# Patient Record
Sex: FEMALE | Race: WHITE | Employment: OTHER | URBAN - METROPOLITAN AREA
[De-identification: names, ages, dates, MRNs, and addresses within clinical notes are randomized per-mention and may not be internally consistent; named-entity substitution may affect disease eponyms.]

---

## 2018-08-09 LAB
ALBUMIN SERPL-MCNC: NORMAL G/DL
ALP BLD-CCNC: NORMAL U/L
ALT SERPL-CCNC: NORMAL U/L
ANION GAP SERPL CALCULATED.3IONS-SCNC: NORMAL MMOL/L
AST SERPL-CCNC: NORMAL U/L
BILIRUB SERPL-MCNC: NORMAL MG/DL (ref 0.1–1.4)
BUN BLDV-MCNC: NORMAL MG/DL
CALCIUM SERPL-MCNC: NORMAL MG/DL
CHLORIDE BLD-SCNC: NORMAL MMOL/L
CO2: NORMAL MMOL/L
CREAT SERPL-MCNC: NORMAL MG/DL
GFR CALCULATED: NORMAL
GLUCOSE BLD-MCNC: NORMAL MG/DL
POTASSIUM SERPL-SCNC: NORMAL MMOL/L
SODIUM BLD-SCNC: NORMAL MMOL/L
TOTAL PROTEIN: NORMAL

## 2018-08-20 ENCOUNTER — HOSPITAL ENCOUNTER (OUTPATIENT)
Age: 68
Discharge: HOME | End: 2018-08-22
Payer: MEDICARE

## 2018-08-20 PROCEDURE — 87088 URINE BACTERIA CULTURE: CPT

## 2018-08-23 LAB — URINE CULTURE, ROUTINE: NORMAL

## 2018-12-20 ENCOUNTER — OFFICE VISIT (OUTPATIENT)
Dept: ENDOCRINOLOGY | Age: 68
End: 2018-12-20
Payer: MEDICARE

## 2018-12-20 VITALS
SYSTOLIC BLOOD PRESSURE: 128 MMHG | TEMPERATURE: 97.6 F | WEIGHT: 158.6 LBS | RESPIRATION RATE: 16 BRPM | HEART RATE: 85 BPM | HEIGHT: 65 IN | BODY MASS INDEX: 26.42 KG/M2 | OXYGEN SATURATION: 97 % | DIASTOLIC BLOOD PRESSURE: 78 MMHG

## 2018-12-20 DIAGNOSIS — E06.3 HYPOTHYROIDISM DUE TO HASHIMOTO'S THYROIDITIS: Primary | ICD-10-CM

## 2018-12-20 DIAGNOSIS — E03.8 HYPOTHYROIDISM DUE TO HASHIMOTO'S THYROIDITIS: Primary | ICD-10-CM

## 2018-12-20 DIAGNOSIS — E04.1 THYROID NODULE: ICD-10-CM

## 2018-12-20 PROCEDURE — 99205 OFFICE O/P NEW HI 60 MIN: CPT | Performed by: INTERNAL MEDICINE

## 2018-12-20 RX ORDER — LIOTHYRONINE SODIUM 5 UG/1
TABLET ORAL
Qty: 30 TABLET | Refills: 5 | Status: SHIPPED | OUTPATIENT
Start: 2018-12-20 | End: 2019-06-19 | Stop reason: SDUPTHER

## 2018-12-20 RX ORDER — LEVOTHYROXINE SODIUM 0.07 MG/1
75 TABLET ORAL DAILY
Qty: 30 TABLET | Refills: 5 | Status: SHIPPED | OUTPATIENT
Start: 2018-12-20 | End: 2019-06-19 | Stop reason: SDUPTHER

## 2018-12-20 RX ORDER — ATENOLOL 50 MG/1
50 TABLET ORAL DAILY
COMMUNITY
End: 2021-04-12 | Stop reason: SDUPTHER

## 2018-12-20 RX ORDER — RANITIDINE 300 MG/1
300 CAPSULE ORAL DAILY PRN
COMMUNITY
End: 2019-12-18

## 2018-12-20 NOTE — PATIENT INSTRUCTIONS
Cont levothyroxine 75 mcg every morning  Change liothyronine (cytomel) to 2.5 mcg every morning and every afternoon  Repeat the TSH now as previously ordered by other physician  See me back in six months  Have a thryoid US performed 1-2 weeks prior to that  Call with questions or concerns :  [phone]

## 2019-06-19 ENCOUNTER — OFFICE VISIT (OUTPATIENT)
Dept: ENDOCRINOLOGY | Age: 69
End: 2019-06-19
Payer: MEDICARE

## 2019-06-19 VITALS
OXYGEN SATURATION: 97 % | WEIGHT: 158.4 LBS | HEART RATE: 78 BPM | HEIGHT: 64 IN | SYSTOLIC BLOOD PRESSURE: 130 MMHG | DIASTOLIC BLOOD PRESSURE: 80 MMHG | BODY MASS INDEX: 27.04 KG/M2 | RESPIRATION RATE: 16 BRPM

## 2019-06-19 DIAGNOSIS — E03.8 HYPOTHYROIDISM DUE TO HASHIMOTO'S THYROIDITIS: Primary | ICD-10-CM

## 2019-06-19 DIAGNOSIS — E06.3 HYPOTHYROIDISM DUE TO HASHIMOTO'S THYROIDITIS: Primary | ICD-10-CM

## 2019-06-19 DIAGNOSIS — E04.1 THYROID NODULE: ICD-10-CM

## 2019-06-19 PROCEDURE — 99214 OFFICE O/P EST MOD 30 MIN: CPT | Performed by: INTERNAL MEDICINE

## 2019-06-19 RX ORDER — LIOTHYRONINE SODIUM 5 UG/1
TABLET ORAL
Qty: 30 TABLET | Refills: 5 | Status: SHIPPED | OUTPATIENT
Start: 2019-06-19 | End: 2020-01-13 | Stop reason: CLARIF

## 2019-06-19 RX ORDER — LEVOTHYROXINE SODIUM 0.07 MG/1
TABLET ORAL
Qty: 30 TABLET | Refills: 5 | Status: SHIPPED | OUTPATIENT
Start: 2019-06-19 | End: 2020-01-23 | Stop reason: SDUPTHER

## 2019-06-19 NOTE — PROGRESS NOTES
CC -   Hypothyroidism, Obesity    HPI -       Pt returns for follow up of last visit with me on 12/20/2019        Instructions from last visit were:  1. Hypothyroidism - controlled              TSH 0.727 8/24/18 on LT4 75 mcg daily and T3 5mcg every morning              I prefer her to have a TSH in the 5.0-6.0 range. However, she prefers the lower edge of nml. I explained the risks for osteoporosis and cardiac problems. She understands. Plan :                          Cont LT4 75 mcg daily                          Split the liothyroinine to 2.5 mcg morning and mid-afternoon                                     Pt already has been ordered a TSH for now by another physician                          Will recheck the TSH in three months                          Needs a DEXA     2. Nontoxic MNG - status unknown              Repeat the US of the thyroid 1-2 weeks prior to the next visit     3.  Questions about adrenal insufficiency              No problems with the adrenal axis - ACTH stim test in 2017 was nml; electrolytes and BP are also nml     3. Follow up              See me back in six weeks          Update from today's encounter:  Presently taking LT4 75mcg daily and liothyronine 2.5mg bid. TSH 0.264  6/15/19  TSH not drawn at three months as planned.  Had done a few days ago    No hair loss, tremors, or temp intolerance, no D/C, no palpitations, +insomnia (chronic), no weight gain or loss    Thyroid US performed at [facility] 6/6/19: Right 1 and 1.2 cm hypoechoic nodules and a 1.6 hypoechoic nodules    No dysphagia or hoarseness    PFSH - no changes    Meds -  Current Outpatient Medications on File Prior to Visit   Medication Sig Dispense Refill    estradiol (VIVELLE) 0.1 MG/24HR APPLY 1 PATCH TO SKIN TWICE A WEEK 24 patch 2    atenolol (TENORMIN) 50 MG tablet Take 50 mg by mouth daily Breaks in half- 25 mg in the am and 25 mg in the pm      ranitidine (ZANTAC) 300 MG capsule Take 300 mg by mouth daily as needed for Heartburn      liothyronine (CYTOMEL) 5 MCG tablet Take one-half tab every morning and one-half tab every mid-afternoon 30 tablet 5    levothyroxine (SYNTHROID) 75 MCG tablet Take 1 tablet by mouth daily 30 tablet 5    docusate sodium (COLACE) 100 MG capsule Take 100 mg by mouth daily [facility]      KOREAN GINSENG PO Take by mouth as needed       Multiple Vitamins-Minerals (CENTRUM CARDIO) TABS Take 1 tablet by mouth daily       DHA-EPA-VITAMIN E PO Take 4 capsules by mouth Mon- Wed- Friday      QUEtiapine (SEROQUEL) 50 MG tablet Take 50 mg by mouth every morning       carbidopa-levodopa (SINEMET CR)  MG per extended release tablet Take by mouth as needed       estradiol cypionate (DEPO-ESTRADIOL) 5 MG/ML injection Inject 1 mL into the muscle once for 1 dose Inject 1ML Every 4 Weeks (Patient taking differently: Inject 0.1 mg into the muscle once Inject 0.1 Every 3 Weeks) 5 mL 0    Coenzyme Q-10 200 MG CAPS Take 800 mg by mouth daily       Ascorbic Acid (VITAMIN C) 100 MG tablet Take 100 mg by mouth Twice weekly      Multiple Vitamins-Minerals (CENTRUM ADULTS PO) daily       aspirin 81 MG tablet Take by mouth daily       methylcellulose 500 MG TABS 1 qd      Ergocalciferol (VITAMIN D2 PO) Take 400 Units by mouth daily       ALPRAZolam (XANAX) 0.5 MG tablet Take 0.5 mg by mouth as needed. Yuan Melgar atorvastatin (LIPITOR) 80 MG tablet Take 80 mg by mouth daily       enalapril (VASOTEC) 2.5 MG tablet Take 2.5 mg by mouth daily       ezetimibe (ZETIA) 10 MG tablet Take 10 mg by mouth daily       lansoprazole (PREVACID) 15 MG capsule Take 15 mg by mouth daily       QUEtiapine (SEROQUEL XR) 200 MG XR tablet Take 200 mg by mouth nightly       venlafaxine (EFFEXOR XR) 75 MG XR capsule Take 75 mg by mouth daily       vitamin E 400 UNIT capsule Take by mouth       No current facility-administered medications on file prior to visit.         ROS - Card: no CP, GI: no N/V/D,  : no

## 2019-06-19 NOTE — PATIENT INSTRUCTIONS
Cont Cytomel 2.5 mcg twice daily  Reduce Levothyroxine 75 mcg to one tablet every day Mon-Sat and one-half tablet on Sundays  Have a TSH performed in six weeks  Have another TSH drawn a few days before the next appt  Get a cd of the [address] study done at [facility] and take to the Radiology Dept at [facility] and ask them to upload it onto the PACS system.  Text me when you have given them the cd [phone])  See me back in six months

## 2019-06-30 ENCOUNTER — HOSPITAL ENCOUNTER (EMERGENCY)
Age: 69
Discharge: HOME | End: 2019-06-30
Attending: EMERGENCY MEDICINE
Payer: MEDICARE

## 2019-06-30 ENCOUNTER — APPOINTMENT (OUTPATIENT)
Dept: GENERAL RADIOLOGY | Age: 69
End: 2019-06-30
Payer: MEDICARE

## 2019-06-30 VITALS
OXYGEN SATURATION: 97 % | TEMPERATURE: 98.2 F | BODY MASS INDEX: 24.92 KG/M2 | DIASTOLIC BLOOD PRESSURE: 73 MMHG | WEIGHT: 146 LBS | SYSTOLIC BLOOD PRESSURE: 156 MMHG | RESPIRATION RATE: 16 BRPM | HEIGHT: 64 IN | HEART RATE: 66 BPM

## 2019-06-30 DIAGNOSIS — M77.8 TENDONITIS OF ELBOW, RIGHT: Primary | ICD-10-CM

## 2019-06-30 PROCEDURE — 99283 EMERGENCY DEPT VISIT LOW MDM: CPT

## 2019-06-30 PROCEDURE — 73080 X-RAY EXAM OF ELBOW: CPT

## 2019-06-30 RX ORDER — NAPROXEN 500 MG/1
500 TABLET ORAL 2 TIMES DAILY
Qty: 14 TABLET | Refills: 0 | Status: SHIPPED | OUTPATIENT
Start: 2019-06-30 | End: 2019-10-24

## 2019-06-30 NOTE — ED PROVIDER NOTES
without rash. Erythema or warmth to the area. There is no streaking up or down the arm. Neurologic: GCS 15,  Psych: Normal Affect      ------------------------------ ED COURSE/MEDICAL DECISION MAKING----------------------  Medications - No data to display      Medical Decision Making:    She was evaluated by  no good treatment plan was discussed. Will place patient on Naprosyn and give her a sling. She should follow-up with Dr. Kimmie Tidwell her orthopedic doctor. Warning signs discussed with the patient. She should return for any worsening symptoms. Counseling: The emergency provider has spoken with the patient and discussed todays results, in addition to providing specific details for the plan of care and counseling regarding the diagnosis and prognosis. Questions are answered at this time and they are agreeable with the plan.      --------------------------------- IMPRESSION AND DISPOSITION ---------------------------------    IMPRESSION  1.  Tendonitis of elbow, right New Problem       DISPOSITION  Disposition: Discharge to home  Patient condition is stable                 Stephanie Manning, [address]  06/30/19 1471

## 2019-10-02 ENCOUNTER — HOSPITAL ENCOUNTER (OUTPATIENT)
Dept: GENERAL RADIOLOGY | Age: 69
Discharge: HOME | End: 2019-10-04
Payer: MEDICARE

## 2019-10-02 DIAGNOSIS — Z12.39 BREAST CANCER SCREENING: ICD-10-CM

## 2019-10-02 PROCEDURE — 77063 BREAST TOMOSYNTHESIS BI: CPT

## 2019-10-17 RX ORDER — AMPICILLIN TRIHYDRATE 250 MG
CAPSULE ORAL DAILY
COMMUNITY
End: 2019-10-24

## 2019-10-17 RX ORDER — AMOXICILLIN 500 MG
CAPSULE ORAL DAILY
COMMUNITY

## 2019-10-18 RX ORDER — MULTIVIT-MIN/IRON/FOLIC ACID/K 18-600-40
CAPSULE ORAL 2 TIMES DAILY
COMMUNITY
End: 2019-10-24

## 2019-10-18 RX ORDER — LORATADINE 10 MG/1
10 TABLET, ORALLY DISINTEGRATING ORAL DAILY
COMMUNITY
End: 2022-03-02

## 2019-10-18 RX ORDER — ATENOLOL 25 MG/1
25 TABLET ORAL DAILY
COMMUNITY
End: 2019-10-24

## 2019-12-18 ENCOUNTER — OFFICE VISIT (OUTPATIENT)
Dept: ENDOCRINOLOGY | Age: 69
End: 2019-12-18
Payer: MEDICARE

## 2019-12-18 VITALS
BODY MASS INDEX: 25.83 KG/M2 | OXYGEN SATURATION: 97 % | WEIGHT: 155 LBS | SYSTOLIC BLOOD PRESSURE: 132 MMHG | DIASTOLIC BLOOD PRESSURE: 78 MMHG | HEIGHT: 65 IN | RESPIRATION RATE: 16 BRPM | HEART RATE: 66 BPM

## 2019-12-18 DIAGNOSIS — E04.1 THYROID NODULE: ICD-10-CM

## 2019-12-18 DIAGNOSIS — E06.3 HYPOTHYROIDISM DUE TO HASHIMOTO'S THYROIDITIS: Primary | ICD-10-CM

## 2019-12-18 DIAGNOSIS — E03.8 HYPOTHYROIDISM DUE TO HASHIMOTO'S THYROIDITIS: Primary | ICD-10-CM

## 2019-12-18 PROCEDURE — 99214 OFFICE O/P EST MOD 30 MIN: CPT | Performed by: INTERNAL MEDICINE

## 2019-12-18 RX ORDER — FAMOTIDINE 40 MG/1
40 TABLET, FILM COATED ORAL DAILY
COMMUNITY

## 2019-12-18 RX ORDER — FAMOTIDINE 20 MG/1
20 TABLET, FILM COATED ORAL 2 TIMES DAILY
COMMUNITY
End: 2019-12-18 | Stop reason: CLARIF

## 2020-01-13 ENCOUNTER — OFFICE VISIT (OUTPATIENT)
Dept: FAMILY MEDICINE CLINIC | Age: 70
End: 2020-01-13
Payer: MEDICARE

## 2020-01-13 VITALS
DIASTOLIC BLOOD PRESSURE: 70 MMHG | SYSTOLIC BLOOD PRESSURE: 114 MMHG | BODY MASS INDEX: 28.34 KG/M2 | TEMPERATURE: 98.2 F | WEIGHT: 154 LBS | OXYGEN SATURATION: 98 % | RESPIRATION RATE: 14 BRPM | HEART RATE: 66 BPM | HEIGHT: 62 IN

## 2020-01-13 PROCEDURE — 99214 OFFICE O/P EST MOD 30 MIN: CPT | Performed by: FAMILY MEDICINE

## 2020-01-13 ASSESSMENT — PATIENT HEALTH QUESTIONNAIRE - PHQ9
1. LITTLE INTEREST OR PLEASURE IN DOING THINGS: 0
SUM OF ALL RESPONSES TO PHQ9 QUESTIONS 1 & 2: 1
SUM OF ALL RESPONSES TO PHQ QUESTIONS 1-9: 1
SUM OF ALL RESPONSES TO PHQ QUESTIONS 1-9: 1
2. FEELING DOWN, DEPRESSED OR HOPELESS: 1

## 2020-01-13 NOTE — PROGRESS NOTES
Medications and allergies also reviewed and updated in chart.     ROS Unless otherwise specified  Review of Systems - General ROS: negative for - chills, fatigue, fever, night sweats, sleep disturbance, weight gain or weight loss  Psychological ROS: negative for - anxiety, behavioral disorder, depression, hallucinations, irritability, memory difficulties, mood swings, sleep disturbances or suicidal ideation  ENT ROS: negative for - epistaxis, headaches, hearing change, nasal congestion, nasal discharge, nasal polyps, sinus pain, tinnitus, vertigo or visual changes  Hematological and Lymphatic ROS: negative for - bleeding problems, blood clots, fatigue or swollen lymph nodes  Respiratory ROS: negative for - cough, orthopnea, shortness of breath, sputum changes, tachypnea or wheezing  Cardiovascular ROS: negative for - chest pain, dyspnea on exertion, irregular heartbeat, loss of consciousness, palpitations, paroxysmal nocturnal dyspnea or rapid heart rate  Gastrointestinal ROS: negative for - abdominal pain, blood in stools, change in bowel habits, constipation, diarrhea, gas/bloating, heartburn or nausea/vomiting  Musculoskeletal ROS: negative for - joint pain, joint stiffness, joint swelling or muscle, back pain, bowel or bladder incontinence  Neurological ROS: negative for - behavioral changes, confusion, dizziness, headaches, memory loss, numbness/tingling, seizures or speech problems, weakness  Dermatological ROS: negative for - dry skin, mole changes, nail changes, pruritus, rash or skin lesion changes    Physical Exam  Wt Readings from Last 3 Encounters:   01/20/20 154 lb 12.8 oz (70.2 kg)   01/13/20 154 lb (69.9 kg)   12/18/19 155 lb (70.3 kg)     Temp Readings from Last 3 Encounters:   01/13/20 98.2 °F (36.8 °C)   06/30/19 98.2 °F (36.8 °C)   12/20/18 97.6 °F (36.4 °C) (Temporal)     BP Readings from Last 3 Encounters:   01/20/20 130/70   01/13/20 114/70   12/18/19 132/78     Pulse Readings from Last 3 Encounters:   01/13/20 66   12/18/19 66   06/30/19 66       General appearance: alert, well appearing, and in no distress, oriented to person, place, and time and normal appearing weight. CVS exam: normal rate, regular rhythm, normal S1, S2, no murmurs, rubs, clicks or gallops. Radial pulses 2+ bilateral.  PT/DP pulse 2+ bilat. No C/C/E    Chest: clear to auscultation, no wheezes, rales or rhonchi, symmetric air entry. Abdomen: Soft, non-tender, non-distended, positive BS in all 4 quadrants    Extremities:Dorsalis pedis pulses palpated bilaterally, no clubbing, cyanosis, edema or erythema, Sensory exam of the foot is normal, tested with the monofilament. Good pulses, no lesions or ulcers, good peripheral pulses. SKIN: warm, dry, no lesions, jaundice, petechiae, pallor, cyanosis, ecchymosis    NEURO: gross motor exam normal by observation, gait normal    Mental status - alert, oriented to person, place, and time, normal mood, behavior, speech, dress, motor activity, and thought processes      Assessment/Plan  Aishwarya Nichols was seen today for established new doctor and hypertension. Diagnoses and all orders for this visit:    Essential hypertension  Mixed hyperlipidemia  Acquired hypothyroidism  - Will review labs. I did spend more than 25 minutes in direct patient care discussing and treating above diagnoses on this patient who is NEW to me. Return in about 6 months (around 7/13/2020) for AWV. Call or go to ED immediately if symptoms worsen or persist.    Educational materials and/or home exercises printed for patient's review and were included in patient instructions on his/her After Visit Summary and given to patient at the end of visit. Counseled regarding above diagnosis, including possible risks and complications,  especially if left uncontrolled.     Counseled regarding the possible side effects, risks, benefits and alternatives to treatment; patient and/or guardian verbalizes understanding, agrees, feels comfortable with and wishes to proceed with above treatment plan. Advised patient to call with any new medication issues, and read all Rx info from pharmacy to assure aware of all possible risks and side effects of medication before taking. Reviewed age and gender appropriate health screening exams and vaccinations. Advised patient regarding importance of keeping up with recommended health maintenance and to schedule as soon as possible if overdue, as this is important in assessing for undiagnosed pathology, especially cancer, as well as protecting against potentially harmful/life threatening disease. Patient and/or guardian verbalizes understanding and agrees with above counseling, assessment and plan. All questions answered.     Lola Lemon, DO

## 2020-01-23 RX ORDER — LEVOTHYROXINE SODIUM 0.07 MG/1
TABLET ORAL
Qty: 30 TABLET | Refills: 5 | Status: SHIPPED
Start: 2020-01-23 | End: 2020-06-09 | Stop reason: SDUPTHER

## 2020-02-06 ENCOUNTER — OFFICE VISIT (OUTPATIENT)
Dept: FAMILY MEDICINE CLINIC | Age: 70
End: 2020-02-06
Payer: MEDICARE

## 2020-02-06 VITALS
WEIGHT: 153.6 LBS | HEIGHT: 65 IN | HEART RATE: 58 BPM | DIASTOLIC BLOOD PRESSURE: 86 MMHG | TEMPERATURE: 98 F | BODY MASS INDEX: 25.59 KG/M2 | OXYGEN SATURATION: 97 % | SYSTOLIC BLOOD PRESSURE: 132 MMHG | RESPIRATION RATE: 16 BRPM

## 2020-02-06 PROCEDURE — 99214 OFFICE O/P EST MOD 30 MIN: CPT | Performed by: FAMILY MEDICINE

## 2020-02-06 RX ORDER — AZITHROMYCIN 250 MG/1
250 TABLET, FILM COATED ORAL SEE ADMIN INSTRUCTIONS
Qty: 6 TABLET | Refills: 0 | Status: SHIPPED | OUTPATIENT
Start: 2020-02-06 | End: 2020-02-11

## 2020-02-06 RX ORDER — QUETIAPINE FUMARATE 50 MG/1
50 TABLET, EXTENDED RELEASE ORAL EVERY MORNING
COMMUNITY
Start: 2020-01-22

## 2020-02-06 NOTE — PROGRESS NOTES
2/10/2020    Chief Complaint   Patient presents with    Cough     Pt has dry cough, drainage, & swollen glands on Lt side       HPI    Hank He is a 68 y.o. patient that presents today with cold symptoms. Upper Respiratory Infection:  Patient complains of symptoms of a URI. Symptoms include congestion, cough, sore throat and swollen glands. Onset of symptoms was many days ago, gradually worsening since that time. She is drinking plenty of fluids. Treatment to date: OTC cough suppressant with minimal ROS. Sick contacts include none. Patient's past medical, surgical, social and/or family history reviewed, updated in chart, and are non-contributory (unless otherwise stated). Medications and allergies also reviewed and updated in chart.      ROS Unless otherwise specified  Review of Systems - General ROS: negative for - chills, fatigue, fever, night sweats, sleep disturbance, weight gain or weight loss  Psychological ROS: negative for - anxiety, behavioral disorder, depression, hallucinations, irritability, memory difficulties, mood swings, sleep disturbances or suicidal ideation  ENT ROS: negative for - epistaxis, headaches, hearing change, nasal congestion, nasal discharge, nasal polyps, sinus pain, tinnitus, vertigo or visual changes  Hematological and Lymphatic ROS: negative for - bleeding problems, blood clots, fatigue or swollen lymph nodes  Respiratory ROS: negative for - cough, orthopnea, shortness of breath, sputum changes, tachypnea or wheezing  Cardiovascular ROS: negative for - chest pain, dyspnea on exertion, irregular heartbeat, loss of consciousness, palpitations, paroxysmal nocturnal dyspnea or rapid heart rate  Gastrointestinal ROS: negative for - abdominal pain, blood in stools, change in bowel habits, constipation, diarrhea, gas/bloating, heartburn or nausea/vomiting  Musculoskeletal ROS: negative for - joint pain, joint stiffness, joint swelling or muscle, back pain, bowel or bladder incontinence  Neurological ROS: negative for - behavioral changes, confusion, dizziness, headaches, memory loss, numbness/tingling, seizures or speech problems, weakness  Dermatological ROS: negative for - dry skin, mole changes, nail changes, pruritus, rash or skin lesion changes    Physical Exam  /86   Pulse 58   Temp 98 °F (36.7 °C)   Resp 16   Ht 5' 4.5\" (1.638 m)   Wt 153 lb 9.6 oz (69.7 kg)   SpO2 97%   BMI 25.96 kg/m²     Wt Readings from Last 3 Encounters:   02/06/20 153 lb 9.6 oz (69.7 kg)   01/20/20 154 lb 12.8 oz (70.2 kg)   01/13/20 154 lb (69.9 kg)     BP Readings from Last 3 Encounters:   02/06/20 132/86   01/20/20 130/70   01/13/20 114/70         General appearance: alert, well appearing, and in no distress, oriented to person, place, and time and normal appearing weight. HEENT:  HEAD: Atraumatic, normocephalic  EYES: PERRL  EOM intact  EARS: bilateral TM's and external ear canals normal  NOSE: nasal mucosa, septum, turbinates normal bilaterally  MOUTH: mucous membranes moist and normal tonsils  NECK: supple, full range of motion, no mass, normal lymphadenopathy, no thyromegaly    CVS exam: normal rate, regular rhythm, normal S1, S2, no murmurs, rubs, clicks or gallops. Radial pulses 2+ bilateral.  PT/DP pulse 2+ bilat. No C/C/E    Chest: clear to auscultation, no wheezes, rales or rhonchi, symmetric air entry. SKIN: no lesions, jaundice, petechiae, pallor, cyanosis, ecchymosis        Assessment/Plan  Sylvie Ramsey was seen today for cough. Diagnoses and all orders for this visit:    Acute bacterial sinusitis  -     azithromycin (ZITHROMAX) 250 MG tablet; Take 1 tablet by mouth See Admin Instructions for 5 days 500mg on day 1 followed by 250mg on days 2 - 5          No follow-ups on file.       Lola Lemon, DO    Call or go to ED immediately if symptoms worsen or persist.    Educational materials and/or home exercises printed for patient's review and were included in patient instructions on his/her After Visit Summary and given to patient at the end of visit. Counseled regarding above diagnosis, including possible risks and complications,  especially if left uncontrolled. Counseled regarding the possible side effects, risks, benefits and alternatives to treatment; patient and/or guardian verbalizes understanding, agrees, feels comfortable with and wishes to proceed with above treatment plan. Advised patient to call with any new medication issues, and read all Rx info from pharmacy to assure aware of all possible risks and side effects of medication before taking. Reviewed age and gender appropriate health screening exams and vaccinations. Advised patient regarding importance of keeping up with recommended health maintenance and to schedule as soon as possible if overdue, as this is important in assessing for undiagnosed pathology, especially cancer, as well as protecting against potentially harmful/life threatening disease. Patient and/or guardian verbalizes understanding and agrees with above counseling, assessment and plan. All questions answered.

## 2020-02-10 ENCOUNTER — TELEPHONE (OUTPATIENT)
Dept: FAMILY MEDICINE CLINIC | Age: 70
End: 2020-02-10

## 2020-02-10 ENCOUNTER — TELEPHONE (OUTPATIENT)
Dept: ENT CLINIC | Age: 70
End: 2020-02-10

## 2020-02-10 NOTE — TELEPHONE ENCOUNTER
Patient was seen on 2/6 for a URI and was given a zpak. She is scheduled for another appointment on 2/18 and would like to get blood work done prior to that appointment due to lymphadenopathy. Last labs were December 2019 which was a TSH.

## 2020-02-10 NOTE — TELEPHONE ENCOUNTER
Pt is scheduled next available in [place]. She would like moved up if possible. She said she has had sinus issues and swollen lymph nodes for a few weeks and would ilke seen sooner if possible.  Thanks

## 2020-02-11 ENCOUNTER — TELEPHONE (OUTPATIENT)
Dept: FAMILY MEDICINE CLINIC | Age: 70
End: 2020-02-11

## 2020-02-11 ENCOUNTER — HOSPITAL ENCOUNTER (OUTPATIENT)
Age: 70
Discharge: HOME | End: 2020-02-11
Payer: MEDICARE

## 2020-02-11 LAB
ALBUMIN SERPL-MCNC: 4.6 G/DL (ref 3.5–5.2)
ALP BLD-CCNC: 53 U/L (ref 35–104)
ALT SERPL-CCNC: 25 U/L (ref 0–32)
ANION GAP SERPL CALCULATED.3IONS-SCNC: 10 MMOL/L (ref 7–16)
AST SERPL-CCNC: 45 U/L (ref 0–31)
BILIRUB SERPL-MCNC: 0.5 MG/DL (ref 0–1.2)
BUN BLDV-MCNC: 11 MG/DL (ref 8–23)
CALCIUM SERPL-MCNC: 9.6 MG/DL (ref 8.6–10.2)
CHLORIDE BLD-SCNC: 102 MMOL/L (ref 98–107)
CHOLESTEROL, TOTAL: 180 MG/DL (ref 0–199)
CO2: 24 MMOL/L (ref 22–29)
CREAT SERPL-MCNC: 0.6 MG/DL (ref 0.5–1)
GFR AFRICAN AMERICAN: >60
GFR NON-AFRICAN AMERICAN: >60 ML/MIN/1.73
GLUCOSE BLD-MCNC: 105 MG/DL (ref 74–99)
HCT VFR BLD CALC: 43.8 % (ref 34–48)
HDLC SERPL-MCNC: 66 MG/DL
HEMOGLOBIN: 14.6 G/DL (ref 11.5–15.5)
LDL CHOLESTEROL CALCULATED: 84 MG/DL (ref 0–99)
MCH RBC QN AUTO: 30.4 PG (ref 26–35)
MCHC RBC AUTO-ENTMCNC: 33.3 % (ref 32–34.5)
MCV RBC AUTO: 91.3 FL (ref 80–99.9)
PDW BLD-RTO: 13 FL (ref 11.5–15)
PLATELET # BLD: 239 E9/L (ref 130–450)
PMV BLD AUTO: 9.4 FL (ref 7–12)
POTASSIUM SERPL-SCNC: 4.5 MMOL/L (ref 3.5–5)
RBC # BLD: 4.8 E12/L (ref 3.5–5.5)
SODIUM BLD-SCNC: 136 MMOL/L (ref 132–146)
TOTAL PROTEIN: 7.1 G/DL (ref 6.4–8.3)
TRIGL SERPL-MCNC: 151 MG/DL (ref 0–149)
TSH SERPL DL<=0.05 MIU/L-ACNC: 1.41 UIU/ML (ref 0.27–4.2)
VLDLC SERPL CALC-MCNC: 30 MG/DL
WBC # BLD: 5.8 E9/L (ref 4.5–11.5)

## 2020-02-11 PROCEDURE — 36415 COLL VENOUS BLD VENIPUNCTURE: CPT

## 2020-02-11 PROCEDURE — 84443 ASSAY THYROID STIM HORMONE: CPT

## 2020-02-11 PROCEDURE — 80061 LIPID PANEL: CPT

## 2020-02-11 PROCEDURE — 80053 COMPREHEN METABOLIC PANEL: CPT

## 2020-02-11 PROCEDURE — 85027 COMPLETE CBC AUTOMATED: CPT

## 2020-02-12 NOTE — TELEPHONE ENCOUNTER
Spoke with patient, informed of doctors advice. Pt voiced understanding. She has a follow-up appointment scheduled but will call the office for an appointment in the mean time if she feels she needs to. Pt is requesting a copy of her recent labwork, is it ok to send?

## 2020-02-12 NOTE — TELEPHONE ENCOUNTER
95% of adults have had mono and will be positive from a PRIOR infection. Plus, there is no treatment but supportive care. Advise on symptomatic relief. Salt water gargles, hydration, Cepacol spray or lozenges, Tylenol or Advil for pain.  Emily Menjivar can see her Friday if still symptomatic

## 2020-04-23 ENCOUNTER — HOSPITAL ENCOUNTER (OUTPATIENT)
Age: 70
Discharge: HOME | End: 2020-04-25
Payer: MEDICARE

## 2020-04-23 LAB
CORTISOL TOTAL: 6.16 MCG/DL (ref 2.68–18.4)
ESTRADIOL LEVEL: 76.9 PG/ML
HCT VFR BLD CALC: 42.9 % (ref 34–48)
HEMOGLOBIN: 14.5 G/DL (ref 11.5–15.5)
IRON SATURATION: 28 % (ref 15–50)
IRON: 85 MCG/DL (ref 37–145)
MCH RBC QN AUTO: 31.1 PG (ref 26–35)
MCHC RBC AUTO-ENTMCNC: 33.8 % (ref 32–34.5)
MCV RBC AUTO: 92.1 FL (ref 80–99.9)
PDW BLD-RTO: 13.3 FL (ref 11.5–15)
PLATELET # BLD: 217 E9/L (ref 130–450)
PMV BLD AUTO: 10.1 FL (ref 7–12)
RBC # BLD: 4.66 E12/L (ref 3.5–5.5)
T3 FREE: 2.3 PG/ML (ref 2–4.4)
T4 FREE: 1.21 NG/DL (ref 0.93–1.7)
TOTAL IRON BINDING CAPACITY: 299 MCG/DL (ref 250–450)
TSH SERPL DL<=0.05 MIU/L-ACNC: 2.15 UIU/ML (ref 0.27–4.2)
VITAMIN D 25-HYDROXY: 45 NG/ML (ref 30–100)
WBC # BLD: 5.5 E9/L (ref 4.5–11.5)

## 2020-04-23 PROCEDURE — 84439 ASSAY OF FREE THYROXINE: CPT

## 2020-04-23 PROCEDURE — 84144 ASSAY OF PROGESTERONE: CPT

## 2020-04-23 PROCEDURE — 82306 VITAMIN D 25 HYDROXY: CPT

## 2020-04-23 PROCEDURE — 84481 FREE ASSAY (FT-3): CPT

## 2020-04-23 PROCEDURE — 82627 DEHYDROEPIANDROSTERONE: CPT

## 2020-04-23 PROCEDURE — 85027 COMPLETE CBC AUTOMATED: CPT

## 2020-04-23 PROCEDURE — 82679 ASSAY OF ESTRONE: CPT

## 2020-04-23 PROCEDURE — 83550 IRON BINDING TEST: CPT

## 2020-04-23 PROCEDURE — 84270 ASSAY OF SEX HORMONE GLOBUL: CPT

## 2020-04-23 PROCEDURE — 84403 ASSAY OF TOTAL TESTOSTERONE: CPT

## 2020-04-23 PROCEDURE — 82670 ASSAY OF TOTAL ESTRADIOL: CPT

## 2020-04-23 PROCEDURE — 83540 ASSAY OF IRON: CPT

## 2020-04-23 PROCEDURE — 84443 ASSAY THYROID STIM HORMONE: CPT

## 2020-04-23 PROCEDURE — 82533 TOTAL CORTISOL: CPT

## 2020-04-23 PROCEDURE — 82677 ASSAY OF ESTRIOL: CPT

## 2020-04-23 PROCEDURE — 84140 ASSAY OF PREGNENOLONE: CPT

## 2020-04-25 LAB
DHEAS (DHEA SULFATE): 1 UG/DL (ref 10–90)
ESTRIOL: 0.03 NG/ML

## 2020-04-27 LAB
ESTRONE: 12.1 PG/ML
PREGNENOLONE: 15 NG/DL (ref 15–132)
PROGESTERONE LEVEL: <0.05 NG/ML
SEX HORMONE BINDING GLOBULIN: 23 NMOL/L (ref 30–135)
TESTOSTERONE FREE-NONMALE: ABNORMAL PG/ML (ref 0.6–3.8)
TESTOSTERONE TOTAL: <3 NG/DL (ref 20–70)

## 2020-06-20 RX ORDER — LEVOTHYROXINE SODIUM 0.07 MG/1
TABLET ORAL
Qty: 78 TABLET | Refills: 1 | Status: SHIPPED
Start: 2020-06-20 | End: 2020-08-10 | Stop reason: SDUPTHER

## 2020-07-09 ENCOUNTER — TELEPHONE (OUTPATIENT)
Dept: FAMILY MEDICINE CLINIC | Age: 70
End: 2020-07-09

## 2020-08-10 NOTE — TELEPHONE ENCOUNTER
Ms Marcus George is almost out of levothyroxine. She has not had any bloodwork done recently, she said she was planning to get it done prior to next appt in November. Are you ok with her waiting until November?   Please advise    Refill to go to  (pending below)

## 2020-08-12 ENCOUNTER — TELEPHONE (OUTPATIENT)
Dept: ENDOCRINOLOGY | Age: 70
End: 2020-08-12

## 2020-08-12 RX ORDER — LEVOTHYROXINE SODIUM 0.07 MG/1
TABLET ORAL
Qty: 78 TABLET | Refills: 0 | Status: SHIPPED
Start: 2020-08-12 | End: 2020-11-17 | Stop reason: SDUPTHER

## 2020-08-12 NOTE — TELEPHONE ENCOUNTER
Pt requests refill for LT4. Since last dose adjustment was a decrease, I am okay with refilling without a TSH. Next appt is in November.    She will have her TSH checked before it  Plan:  Cont LT4 75 mcg daily Mon-Sat, none on Sun  Recheck TSH a few days prior to next appt in [place] [address]  08/12/20

## 2020-08-12 NOTE — TELEPHONE ENCOUNTER
Left message for Jv Goodman advising her refill was sent in . Lab req sent in mail- I advised her to make sure she gets labs done prior to next visit.

## 2020-10-12 ENCOUNTER — HOSPITAL ENCOUNTER (OUTPATIENT)
Age: 70
Discharge: HOME | End: 2020-10-14
Payer: MEDICARE

## 2020-10-12 LAB
BASOPHILS ABSOLUTE: 0.05 E9/L (ref 0–0.2)
BASOPHILS RELATIVE PERCENT: 0.8 % (ref 0–2)
CORTISOL TOTAL: 10.69 MCG/DL (ref 2.68–18.4)
EOSINOPHILS ABSOLUTE: 0.16 E9/L (ref 0.05–0.5)
EOSINOPHILS RELATIVE PERCENT: 2.4 % (ref 0–6)
ESTRADIOL LEVEL: 88.9 PG/ML
HCT VFR BLD CALC: 42.6 % (ref 34–48)
HEMOGLOBIN: 14.3 G/DL (ref 11.5–15.5)
IMMATURE GRANULOCYTES #: 0.02 E9/L
IMMATURE GRANULOCYTES %: 0.3 % (ref 0–5)
LYMPHOCYTES ABSOLUTE: 2.7 E9/L (ref 1.5–4)
LYMPHOCYTES RELATIVE PERCENT: 40.5 % (ref 20–42)
MCH RBC QN AUTO: 30.6 PG (ref 26–35)
MCHC RBC AUTO-ENTMCNC: 33.6 % (ref 32–34.5)
MCV RBC AUTO: 91 FL (ref 80–99.9)
MONOCYTES ABSOLUTE: 0.48 E9/L (ref 0.1–0.95)
MONOCYTES RELATIVE PERCENT: 7.2 % (ref 2–12)
NEUTROPHILS ABSOLUTE: 3.25 E9/L (ref 1.8–7.3)
NEUTROPHILS RELATIVE PERCENT: 48.8 % (ref 43–80)
PDW BLD-RTO: 13.2 FL (ref 11.5–15)
PLATELET # BLD: 246 E9/L (ref 130–450)
PMV BLD AUTO: 9.5 FL (ref 7–12)
RBC # BLD: 4.68 E12/L (ref 3.5–5.5)
T3 FREE: 2.1 PG/ML (ref 2–4.4)
T4 FREE: 1.02 NG/DL (ref 0.93–1.7)
TSH SERPL DL<=0.05 MIU/L-ACNC: 1.95 UIU/ML (ref 0.27–4.2)
VITAMIN D 25-HYDROXY: 42 NG/ML (ref 30–100)
WBC # BLD: 6.7 E9/L (ref 4.5–11.5)

## 2020-10-12 PROCEDURE — 84144 ASSAY OF PROGESTERONE: CPT

## 2020-10-12 PROCEDURE — 82670 ASSAY OF TOTAL ESTRADIOL: CPT

## 2020-10-12 PROCEDURE — 85025 COMPLETE CBC W/AUTO DIFF WBC: CPT

## 2020-10-12 PROCEDURE — 82306 VITAMIN D 25 HYDROXY: CPT

## 2020-10-12 PROCEDURE — 84140 ASSAY OF PREGNENOLONE: CPT

## 2020-10-12 PROCEDURE — 82533 TOTAL CORTISOL: CPT

## 2020-10-12 PROCEDURE — 84270 ASSAY OF SEX HORMONE GLOBUL: CPT

## 2020-10-12 PROCEDURE — 84481 FREE ASSAY (FT-3): CPT

## 2020-10-12 PROCEDURE — 82627 DEHYDROEPIANDROSTERONE: CPT

## 2020-10-12 PROCEDURE — 84439 ASSAY OF FREE THYROXINE: CPT

## 2020-10-12 PROCEDURE — 84443 ASSAY THYROID STIM HORMONE: CPT

## 2020-10-12 PROCEDURE — 84403 ASSAY OF TOTAL TESTOSTERONE: CPT

## 2020-10-15 LAB
DHEAS (DHEA SULFATE): 8 UG/DL (ref 10–90)
PREGNENOLONE: 10 NG/DL (ref 15–132)
PROGESTERONE LEVEL: 2.17 NG/ML
SEX HORMONE BINDING GLOBULIN: 21 NMOL/L (ref 30–135)
TESTOSTERONE FREE-NONMALE: ABNORMAL PG/ML (ref 0.6–3.8)
TESTOSTERONE TOTAL: <3 NG/DL (ref 20–70)

## 2020-10-27 ENCOUNTER — HOSPITAL ENCOUNTER (OUTPATIENT)
Dept: ULTRASOUND IMAGING | Age: 70
Discharge: HOME | End: 2020-10-29
Payer: MEDICARE

## 2020-10-27 ENCOUNTER — HOSPITAL ENCOUNTER (OUTPATIENT)
Age: 70
Discharge: HOME | End: 2020-10-27
Payer: MEDICARE

## 2020-10-27 LAB
ALBUMIN SERPL-MCNC: 4.3 G/DL (ref 3.5–5.2)
ALP BLD-CCNC: 58 U/L (ref 35–104)
ALT SERPL-CCNC: 32 U/L (ref 0–32)
ANION GAP SERPL CALCULATED.3IONS-SCNC: 7 MMOL/L (ref 7–16)
AST SERPL-CCNC: 31 U/L (ref 0–31)
BILIRUB SERPL-MCNC: 0.3 MG/DL (ref 0–1.2)
BUN BLDV-MCNC: 11 MG/DL (ref 8–23)
CALCIUM SERPL-MCNC: 9.5 MG/DL (ref 8.6–10.2)
CHLORIDE BLD-SCNC: 104 MMOL/L (ref 98–107)
CHOLESTEROL, TOTAL: 172 MG/DL (ref 0–199)
CO2: 28 MMOL/L (ref 22–29)
CREAT SERPL-MCNC: 0.6 MG/DL (ref 0.5–1)
GFR AFRICAN AMERICAN: >60
GFR NON-AFRICAN AMERICAN: >60 ML/MIN/1.73
GLUCOSE BLD-MCNC: 96 MG/DL (ref 74–99)
HCT VFR BLD CALC: 41.8 % (ref 34–48)
HDLC SERPL-MCNC: 62 MG/DL
HEMOGLOBIN: 13.9 G/DL (ref 11.5–15.5)
LDL CHOLESTEROL CALCULATED: 87 MG/DL (ref 0–99)
MCH RBC QN AUTO: 30.5 PG (ref 26–35)
MCHC RBC AUTO-ENTMCNC: 33.3 % (ref 32–34.5)
MCV RBC AUTO: 91.7 FL (ref 80–99.9)
PDW BLD-RTO: 13.2 FL (ref 11.5–15)
PLATELET # BLD: 237 E9/L (ref 130–450)
PMV BLD AUTO: 9.2 FL (ref 7–12)
POTASSIUM SERPL-SCNC: 4.3 MMOL/L (ref 3.5–5)
RBC # BLD: 4.56 E12/L (ref 3.5–5.5)
SODIUM BLD-SCNC: 139 MMOL/L (ref 132–146)
TOTAL PROTEIN: 6.7 G/DL (ref 6.4–8.3)
TRIGL SERPL-MCNC: 117 MG/DL (ref 0–149)
TSH SERPL DL<=0.05 MIU/L-ACNC: 2.51 UIU/ML (ref 0.27–4.2)
VLDLC SERPL CALC-MCNC: 23 MG/DL
WBC # BLD: 5.9 E9/L (ref 4.5–11.5)

## 2020-10-27 PROCEDURE — 80061 LIPID PANEL: CPT

## 2020-10-27 PROCEDURE — 76775 US EXAM ABDO BACK WALL LIM: CPT

## 2020-10-27 PROCEDURE — 76536 US EXAM OF HEAD AND NECK: CPT

## 2020-10-27 PROCEDURE — 85027 COMPLETE CBC AUTOMATED: CPT

## 2020-10-27 PROCEDURE — 93880 EXTRACRANIAL BILAT STUDY: CPT

## 2020-10-27 PROCEDURE — 80053 COMPREHEN METABOLIC PANEL: CPT

## 2020-10-27 PROCEDURE — 36415 COLL VENOUS BLD VENIPUNCTURE: CPT

## 2020-10-27 PROCEDURE — 84443 ASSAY THYROID STIM HORMONE: CPT

## 2020-11-17 ENCOUNTER — OFFICE VISIT (OUTPATIENT)
Dept: ENDOCRINOLOGY | Age: 70
End: 2020-11-17
Payer: MEDICARE

## 2020-11-17 VITALS
WEIGHT: 159 LBS | HEART RATE: 73 BPM | BODY MASS INDEX: 26.87 KG/M2 | OXYGEN SATURATION: 97 % | SYSTOLIC BLOOD PRESSURE: 124 MMHG | TEMPERATURE: 97.6 F | DIASTOLIC BLOOD PRESSURE: 2 MMHG

## 2020-11-17 PROCEDURE — 99214 OFFICE O/P EST MOD 30 MIN: CPT | Performed by: INTERNAL MEDICINE

## 2020-11-17 RX ORDER — LEVOTHYROXINE SODIUM 0.07 MG/1
TABLET ORAL
Qty: 22 TABLET | Refills: 5 | Status: SHIPPED
Start: 2020-11-17 | End: 2021-10-12 | Stop reason: SDUPTHER

## 2020-11-17 NOTE — PATIENT INSTRUCTIONS
Decrease Synthroid 75 mcg to one tablet daily Monday -Friday; do not take on Sat or Sun  Repeat TSH in two months  Have a repeat US in one year  Follow up with Dr. Florin Torres one week after that

## 2020-11-17 NOTE — PROGRESS NOTES
CC -   Hypothyroidism, Obesity    HPI -   Last visit: 12/18/19  First visit: 12/20/18    12/10/19 TSH 1.10  LT4 75 mcg to one tab daily M-Sa, one-half tab Quick + liothyroinine to 2.5 mcg morning and mid-afternoon  04/23/20 TSH 2.15 LT4 75 mcg one tablet daily M-Sa, none Quick (pt did not want to change dose until seeing me)  10/12/20 TSH 2.51 LT4 75 mcg one tablet daily M-Sa, none Quick    + fatigue, + chronic insomnia, no palpitations/tremors/diaphoresis, + dry skin, + hair loss, no temp intolerance, + wt gain, no diarrhea/constipation    06/06/19 US Thyroid ([facility]): Right 1 and 1.2 cm hypoechoic nodules and a 1.6 hypoechoic nodules  10/27/20 US Thyroid: Hypoechoic right nodule 1.1 cm, isoechoic right nodule 1.1, left hypoechoic nodule 0.8 cm    No dysphagia or hoarseness    PFSH -   HTN, HLD, Hypothyroidism, Thyroid nodule, GERD, Bipolar Type 1    Meds -  LT4, T3, Atorvastatin, Ezetimibe, Omega 3 fish oil, Atenolol, Enalapril, Estrogen (both Injection and Patch), Effexor, Seroquel, Pepcid, Prevacid    ROS - Card: no CP, GI: no N/V/D,  : no dysuria    PE -  Gen : BP (!) 124/2   Pulse 73   Temp 97.6 °F (36.4 °C)   Wt 159 lb (72.1 kg)   SpO2 97%   BMI 26.87 kg/m²    WN, WD, NAD  Neck:   No thyroid nodules present  Lung: Nml resp effort  Psych: Normal mood   Full affect  Neur: Moves all extremities well   No tremors of outstretched hands    A/P -     1. Hypothyroidism - controlled              See HPI for description              I prefer her to have a TSH in the 5.0-6.0 range   10/12/20 TSH 2.51 LT4 75 mcg one tablet daily M-Sa, none Quick              Plan :                          Decrease LT4 75 mcg to one tab daily M-F, none Sa or Quick                             Repeat TSH in two monthsL     2.   Nontoxic MNG - status unknown              I reviewed the images of the 10/27/20 US of the thyroid and compared with the 2013 US with the pt today   Hypoechoic right nodule 1.1 cm, isoechoic right nodule 1.1, left hypoechoic nodule 0.8 cm   No concerning changes are present   Because of needing to make decisions about whether to have a biopsy of one of the nodes each year, she would benefit from Dr. Татьяна Connell greater expertise in this field. She is agreeable with transferring care to him   Plan:   Repeat US of thyroid in one year    3. Needs screening for osteoporosis   Will assume she has it until proving otherwise with a Dexa   Make sure that daily calcium intake is 1200 mg daily   Take D3 2,000 IU daily     4. Follow up              Transfer care to Dr. Eliezer Walton for follow up of her thyroid nodules    I spent 25 minutes in a face to face encounter with Desire Braxton today.    >15 minutes of this was in education and counseling regarding management of thyroid nodules, reviewing the images of her thyroid US with her and comparing them to last year's study, vitamin D and calcium needs and management of her hypothyroidism    Lucio Salas MD  Endocrinology/Obesity  11/17/20

## 2021-01-13 ENCOUNTER — OFFICE VISIT (OUTPATIENT)
Dept: FAMILY MEDICINE CLINIC | Age: 71
End: 2021-01-13
Payer: MEDICARE

## 2021-01-13 VITALS
BODY MASS INDEX: 27.66 KG/M2 | HEIGHT: 64 IN | OXYGEN SATURATION: 99 % | SYSTOLIC BLOOD PRESSURE: 144 MMHG | DIASTOLIC BLOOD PRESSURE: 79 MMHG | TEMPERATURE: 97.7 F | HEART RATE: 87 BPM | WEIGHT: 162 LBS | RESPIRATION RATE: 16 BRPM

## 2021-01-13 DIAGNOSIS — J01.90 ACUTE BACTERIAL SINUSITIS: Primary | ICD-10-CM

## 2021-01-13 DIAGNOSIS — B96.89 ACUTE BACTERIAL SINUSITIS: Primary | ICD-10-CM

## 2021-01-13 PROCEDURE — 99213 OFFICE O/P EST LOW 20 MIN: CPT | Performed by: FAMILY MEDICINE

## 2021-01-13 RX ORDER — AZITHROMYCIN 250 MG/1
TABLET, FILM COATED ORAL
Qty: 1 PACKET | Refills: 0 | Status: SHIPPED
Start: 2021-01-13 | End: 2021-02-04

## 2021-01-13 SDOH — ECONOMIC STABILITY: TRANSPORTATION INSECURITY
IN THE PAST 12 MONTHS, HAS THE LACK OF TRANSPORTATION KEPT YOU FROM MEDICAL APPOINTMENTS OR FROM GETTING MEDICATIONS?: NO

## 2021-01-13 SDOH — ECONOMIC STABILITY: INCOME INSECURITY: HOW HARD IS IT FOR YOU TO PAY FOR THE VERY BASICS LIKE FOOD, HOUSING, MEDICAL CARE, AND HEATING?: NOT HARD AT ALL

## 2021-01-13 SDOH — ECONOMIC STABILITY: FOOD INSECURITY: WITHIN THE PAST 12 MONTHS, YOU WORRIED THAT YOUR FOOD WOULD RUN OUT BEFORE YOU GOT MONEY TO BUY MORE.: NEVER TRUE

## 2021-01-13 SDOH — ECONOMIC STABILITY: TRANSPORTATION INSECURITY
IN THE PAST 12 MONTHS, HAS LACK OF TRANSPORTATION KEPT YOU FROM MEETINGS, WORK, OR FROM GETTING THINGS NEEDED FOR DAILY LIVING?: NO

## 2021-01-13 ASSESSMENT — PATIENT HEALTH QUESTIONNAIRE - PHQ9
SUM OF ALL RESPONSES TO PHQ9 QUESTIONS 1 & 2: 0
SUM OF ALL RESPONSES TO PHQ QUESTIONS 1-9: 0
1. LITTLE INTEREST OR PLEASURE IN DOING THINGS: 0
2. FEELING DOWN, DEPRESSED OR HOPELESS: 0

## 2021-01-13 NOTE — PROGRESS NOTES
abdominal pain, blood in stools, change in bowel habits, constipation, diarrhea, gas/bloating, heartburn or nausea/vomiting  Musculoskeletal ROS: negative for - joint pain, joint stiffness, joint swelling or muscle, back pain, bowel or bladder incontinence  Neurological ROS: negative for - behavioral changes, confusion, dizziness, headaches, memory loss, numbness/tingling, seizures or speech problems, weakness  Dermatological ROS: negative for - dry skin, mole changes, nail changes, pruritus, rash or skin lesion changes    Physical Exam  Temp Readings from Last 3 Encounters:   01/13/21 97.7 °F (36.5 °C) (Temporal)   11/17/20 97.6 °F (36.4 °C)   02/06/20 98 °F (36.7 °C)     Wt Readings from Last 3 Encounters:   01/13/21 162 lb (73.5 kg)   11/17/20 159 lb (72.1 kg)   05/14/20 155 lb (70.3 kg)     BP Readings from Last 3 Encounters:   01/13/21 (!) 144/79   11/17/20 (!) 124/2   05/14/20 120/80     Pulse Readings from Last 3 Encounters:   01/13/21 87   11/17/20 73   02/06/20 58       General appearance: alert, well appearing, and in no distress, oriented to person, place, and time and normal appearing weight. CVS exam: normal rate, regular rhythm, normal S1, S2, no murmurs, rubs, clicks or gallops. Radial pulses 2+ bilateral.  PT/DP pulse 2+ bilat. No C/C/E    Chest: clear to auscultation, no wheezes, rales or rhonchi, symmetric air entry. Abdomen: Soft, non-tender, non-distended, positive BS in all 4 quadrants    Extremities:Dorsalis pedis pulses palpated bilaterally, no clubbing, cyanosis, edema or erythema,     SKIN: no lesions, jaundice, petechiae, pallor, cyanosis, ecchymosis    NEURO: gross motor exam normal by observation, gait normal    Mental status - alert, oriented to person, place, and time, normal mood, behavior, speech, dress, motor activity, and thought processes      Assessment/Plan  Luis Alfredo Turner was seen today for diverticulitis.     Diagnoses and all orders for this visit:    Acute bacterial sinusitis  -     azithromycin (ZITHROMAX Z-GEOVANY) 250 MG tablet; 2 tab po day 1 then 1 tab po day 2-5          No follow-ups on file. Lola Lemon, DO    Call or go to ED immediately if symptoms worsen or persist.    Educational materials and/or home exercises printed for patient's review and were included in patient instructions on his/her After Visit Summary and given to patient at the end of visit. Counseled regarding above diagnosis, including possible risks and complications,  especially if left uncontrolled. Counseled regarding the possible side effects, risks, benefits and alternatives to treatment; patient and/or guardian verbalizes understanding, agrees, feels comfortable with and wishes to proceed with above treatment plan. Advised patient to call with any new medication issues, and read all Rx info from pharmacy to assure aware of all possible risks and side effects of medication before taking. Reviewed age and gender appropriate health screening exams and vaccinations. Advised patient regarding importance of keeping up with recommended health maintenance and to schedule as soon as possible if overdue, as this is important in assessing for undiagnosed pathology, especially cancer, as well as protecting against potentially harmful/life threatening disease. Patient and/or guardian verbalizes understanding and agrees with above counseling, assessment and plan. All questions answered.

## 2021-02-04 DIAGNOSIS — E34.9 HORMONE IMBALANCE: ICD-10-CM

## 2021-02-04 DIAGNOSIS — E27.9 ADRENAL GLAND DYSFUNCTION (HCC): ICD-10-CM

## 2021-02-04 LAB — CORTISOL TOTAL: 13.86 MCG/DL (ref 2.68–18.4)

## 2021-02-09 LAB — PREGNENOLONE: 16 NG/DL (ref 15–132)

## 2021-02-10 DIAGNOSIS — E34.9 HORMONE IMBALANCE: ICD-10-CM

## 2021-02-10 DIAGNOSIS — R53.83 OTHER FATIGUE: ICD-10-CM

## 2021-02-12 LAB
DHEAS (DHEA SULFATE): 55 UG/DL (ref 10–90)
SEX HORMONE BINDING GLOBULIN: 26 NMOL/L (ref 30–135)
TESTOSTERONE FREE-NONMALE: ABNORMAL PG/ML (ref 0.6–3.8)
TESTOSTERONE TOTAL: <3 NG/DL (ref 20–70)

## 2021-03-02 ENCOUNTER — OFFICE VISIT (OUTPATIENT)
Dept: FAMILY MEDICINE CLINIC | Age: 71
End: 2021-03-02
Payer: MEDICARE

## 2021-03-02 VITALS
DIASTOLIC BLOOD PRESSURE: 76 MMHG | OXYGEN SATURATION: 98 % | HEART RATE: 71 BPM | TEMPERATURE: 98 F | HEIGHT: 64 IN | SYSTOLIC BLOOD PRESSURE: 116 MMHG | BODY MASS INDEX: 28.51 KG/M2 | RESPIRATION RATE: 16 BRPM | WEIGHT: 167 LBS

## 2021-03-02 DIAGNOSIS — Z13.31 POSITIVE DEPRESSION SCREENING: ICD-10-CM

## 2021-03-02 DIAGNOSIS — E03.9 ACQUIRED HYPOTHYROIDISM: ICD-10-CM

## 2021-03-02 DIAGNOSIS — L30.4 INTERTRIGO: ICD-10-CM

## 2021-03-02 DIAGNOSIS — E78.2 MIXED HYPERLIPIDEMIA: ICD-10-CM

## 2021-03-02 DIAGNOSIS — Z00.00 ROUTINE GENERAL MEDICAL EXAMINATION AT A HEALTH CARE FACILITY: Primary | ICD-10-CM

## 2021-03-02 PROCEDURE — G0439 PPPS, SUBSEQ VISIT: HCPCS | Performed by: FAMILY MEDICINE

## 2021-03-02 PROCEDURE — G8431 POS CLIN DEPRES SCRN F/U DOC: HCPCS | Performed by: FAMILY MEDICINE

## 2021-03-02 RX ORDER — NYSTATIN 100000 [USP'U]/G
POWDER TOPICAL
Qty: 1 BOTTLE | Refills: 0 | Status: SHIPPED
Start: 2021-03-02 | End: 2022-02-08 | Stop reason: SDUPTHER

## 2021-03-02 ASSESSMENT — PATIENT HEALTH QUESTIONNAIRE - PHQ9
SUM OF ALL RESPONSES TO PHQ QUESTIONS 1-9: 22
7. TROUBLE CONCENTRATING ON THINGS, SUCH AS READING THE NEWSPAPER OR WATCHING TELEVISION: 3
9. THOUGHTS THAT YOU WOULD BE BETTER OFF DEAD, OR OF HURTING YOURSELF: 0
SUM OF ALL RESPONSES TO PHQ9 QUESTIONS 1 & 2: 4
5. POOR APPETITE OR OVEREATING: 3
8. MOVING OR SPEAKING SO SLOWLY THAT OTHER PEOPLE COULD HAVE NOTICED. OR THE OPPOSITE, BEING SO FIGETY OR RESTLESS THAT YOU HAVE BEEN MOVING AROUND A LOT MORE THAN USUAL: 3
6. FEELING BAD ABOUT YOURSELF - OR THAT YOU ARE A FAILURE OR HAVE LET YOURSELF OR YOUR FAMILY DOWN: 3
2. FEELING DOWN, DEPRESSED OR HOPELESS: 2
3. TROUBLE FALLING OR STAYING ASLEEP: 3
SUM OF ALL RESPONSES TO PHQ QUESTIONS 1-9: 22
1. LITTLE INTEREST OR PLEASURE IN DOING THINGS: 2

## 2021-03-02 ASSESSMENT — COLUMBIA-SUICIDE SEVERITY RATING SCALE - C-SSRS: 6. HAVE YOU EVER DONE ANYTHING, STARTED TO DO ANYTHING, OR PREPARED TO DO ANYTHING TO END YOUR LIFE?: NO

## 2021-03-02 NOTE — PROGRESS NOTES
Medicare Annual Wellness Visit  Name: Timur Chavez Date: 3/2/2021   MRN: [id number] Sex: Female   Age: 65 y.o. Ethnicity: Non-/Non    : 1950 Race: Lianna Horton is here for Medicare AWV and Manic Behavior (wants to talk to a counselor )    Screenings for behavioral, psychosocial and functional/safety risks, and cognitive dysfunction are all negative except as indicated below. These results, as well as other patient data from the [address] form, are documented in Flowsheets linked to this Encounter. Allergies   Allergen Reactions    Codeine Hives and Shortness Of Breath    Ampicillin Hives     SOB    Meperidine      reaction with Parkinson's meds.  Meperidine Hcl     Morphine Other (See Comments)     \"psychotic\"    Nitrofurantoin Hives     SOB    Penicillin G     Penicillins Hives     SOB    Sulfa Antibiotics Hives     SOB    Vancomycin Hives     SOB         Prior to Visit Medications    Medication Sig Taking? Authorizing Provider   nystatin (MYCOSTATIN) 655380 UNIT/GM powder Apply 3 times daily.  Yes Lola Miles DO   Estradiol (IMVEXXY MAINTENANCE PACK) 10 MCG INST Place 1 capsule vaginally Twice a Week Yes Malva Liming, PA-C   Estradiol Starter Pack (IMVEXXY STARTER PACK) 10 MCG INST Place 1 capsule vaginally daily Yes Malva Liming, PA-C   estradiol (VIVELLE) 0.1 MG/24HR APPLY 1 PATCH TO SKIN TWICE A WEEK Yes David Marcus PA-C   progesterone (PROMETRIUM) 200 MG capsule Take 1 capsule by mouth nightly Yes Malva Liming, PA-C   Pregnenolone Micronized POWD Pregnenolone SR 20mg and 7-keto-DHEA SR 7.5mg one tab po q d (Disp: 3 mo supply) 1bottle=1month Yes Malva Liming, PA-C   levothyroxine (SYNTHROID) 75 MCG tablet Take 1 tablet every day Mon-Fri Yes Miesha Iqbal MD   QUEtiapine (SEROQUEL XR) 50 MG extended release tablet Take 50 mg by mouth every morning  Yes Historical Provider, MD   famotidine (PEPCID) 40 MG tablet Take 40 mg by mouth daily Yes Historical Provider, MD   loratadine (CLARITIN REDITABS) 10 MG dissolvable tablet Take 10 mg by mouth daily Yes Historical Provider, MD   [employer] Christineard MISC by Does not apply route Yes Historical Provider, MD   Cholecalciferol (VITAMIN D3) 1000 units CAPS Take by mouth daily Take 1 by mouth daily Yes Historical Provider, MD   Omega-3 Fatty Acids (FISH OIL) 1200 MG CAPS Take by mouth daily Take 2 by mouth everyday Yes Historical Provider, MD   atenolol (TENORMIN) 50 MG tablet Take 50 mg by mouth daily Breaks in half- 25 mg in the am and 25 mg in the pm Yes Historical Provider, MD   docusate sodium (COLACE) 100 MG capsule Take 100 mg by mouth daily Col-Rite Yes Historical Provider, MD   Syriac GINSENG PO Take by mouth as needed  Yes Historical Provider, MD   Multiple Vitamins-Minerals (CENTRUM CARDIO) TABS Take 1 tablet by mouth daily  Yes Historical Provider, MD   DHA-EPA-VITAMIN E PO Take 4 capsules by mouth Mon- Wed- Friday Yes Historical Provider, MD   Coenzyme Q-10 200 MG CAPS Take 800 mg by mouth daily  Yes Historical Provider, MD   Ascorbic Acid (VITAMIN C) 100 MG tablet Take 100 mg by mouth Twice weekly Yes Historical Provider, MD   methylcellulose 500 MG TABS 1 qd Yes Historical Provider, MD   Ergocalciferol (VITAMIN D2 PO) Take 400 Units by mouth daily  Yes Historical Provider, MD   ALPRAZolam (XANAX) 0.5 MG tablet Take 0.5 mg by mouth as needed.  . Yes Historical Provider, MD   atorvastatin (LIPITOR) 80 MG tablet Take 80 mg by mouth daily  Yes Historical Provider, MD   enalapril (VASOTEC) 2.5 MG tablet Take 2.5 mg by mouth daily  Yes Historical Provider, MD   ezetimibe (ZETIA) 10 MG tablet Take 10 mg by mouth daily  Yes Historical Provider, MD   lansoprazole (PREVACID) 15 MG capsule Take 15 mg by mouth daily  Yes Historical Provider, MD   QUEtiapine (SEROQUEL XR) 200 MG XR tablet Take 200 mg by mouth nightly  Yes Historical Provider, MD   venlafaxine (EFFEXOR XR) 75 MG XR capsule Take 75 mg by mouth daily  Yes Historical Provider, MD   vitamin E 400 UNIT capsule Take by mouth Yes Historical Provider, MD         Past Medical History:   Diagnosis Date    Adverse effect of female hormone replacement therapy     Dr. Amber Conley    Bipolar disorder [facility])     Mixed, has required hospitalization in the past Dr. Logan De La O Depression     GERD (gastroesophageal reflux disease)     GERD Management per Dr. Kristin Felix Hyperlipidemia     Management per [facility], clinical Cardiology, Dr. Braxton Montano Hypertension     Management per [facility], clinical Cardiology    Hypothyroidism     with nodule - Managed by Dr. Cleda Boas Thyroid disease        Past Surgical History:   Procedure Laterality Date    HYSTERECTOMY      HYSTERECTOMY, TOTAL ABDOMINAL  1979    remote    UPPER GASTROINTESTINAL ENDOSCOPY  02/23/2018    Dr. Vianey Neal         Family History   Problem Relation Age of Onset    Heart Disease Paternal Grandmother     Heart Disease Father         Aortic aneurysm (both his sisters and his mother as well)    Heart Disease Mother     No Known Problems Daughter     Prostate Cancer Son        CareTeam (Including outside providers/suppliers regularly involved in providing care):   Patient Care Team:  Honorio Miles, DO as PCP - General (Family Medicine)  [facility] as [facility] - [facility] Empaneled Provider    Wt Readings from Last 3 Encounters:   03/02/21 167 lb (75.8 kg)   02/04/21 160 lb (72.6 kg)   01/13/21 162 lb (73.5 kg)     Vitals:    03/02/21 1316   BP: 116/76   Pulse: 71   Resp: 16   Temp: 98 °F (36.7 °C)   SpO2: 98%   Weight: 167 lb (75.8 kg)   Height: 5' 4\" (1.626 m)     Body mass index is 28.67 kg/m². Based upon direct observation of the patient, evaluation of cognition reveals recent and remote memory intact.     General Appearance: alert and oriented to person, place and time, well developed and well- nourished, in no acute distress  Skin: you have a Living Will?: Yes  Advance Directives     Power of [address] Will ACP-Advance Directive ACP-Power of     Not on File Not on File Not on File Not on File      General Health Risk Interventions:  · Stress: patient declines any further evaluation/treatment for this issue will call in when ready to see Counselor    Health Habits/Nutrition:  Health Habits/Nutrition  Do you exercise for at least 20 minutes 2-3 times per week?: Yes  Have you lost any weight without trying in the past 3 months?: No  Do you eat only one meal per day?: (!) Yes  Have you seen the dentist within the past year?: Yes  Body mass index: (!) 28.66  Health Habits/Nutrition Interventions:  · none       Personalized Preventive Plan   Current Health Maintenance Status  Immunization History   Administered Date(s) Administered    Influenza, High Dose (Fluzone 65 yrs and older) 10/31/2019    Influenza, Triv, inactivated, subunit, adjuvanted, IM (Fluad 65 yrs and older) 10/19/2018        Health Maintenance   Topic Date Due    Hepatitis C screen  Never done    COVID-19 Vaccine (1 of 2) Never done    DTaP/Tdap/Td vaccine (1 - Tdap) Never done    Shingles Vaccine (1 of 2) Never done    DEXA (modify frequency per FRAX score)  Never done   [employer] Visit (AWV)  Never done    Breast cancer screen  10/02/2021    Lipid screen  10/27/2021    Potassium monitoring  10/27/2021    Creatinine monitoring  10/27/2021    Colon cancer screen colonoscopy  02/04/2030    Flu vaccine  Completed    Pneumococcal 65+ years Vaccine  Completed    Hepatitis A vaccine  Aged Out    Hepatitis B vaccine  Aged Out    Hib vaccine  Aged Out    Meningococcal (ACWY) vaccine  Aged Out     Recommendations for Ziipa Due: see orders and patient instructions/AVS.  .   Recommended screening schedule for the next 5-10 years is provided to the patient in written form: see Patient Instructions/AVS.    Carline Da Silva was seen today for medicare

## 2021-03-02 NOTE — PATIENT INSTRUCTIONS
Personalized Preventive Plan for Rico Valdez - 3/2/2021  Medicare offers a range of preventive health benefits. Some of the tests and screenings are paid in full while other may be subject to a deductible, co-insurance, and/or copay. Some of these benefits include a comprehensive review of your medical history including lifestyle, illnesses that may run in your family, and various assessments and screenings as appropriate. After reviewing your medical record and screening and assessments performed today your provider may have ordered immunizations, labs, imaging, and/or referrals for you. A list of these orders (if applicable) as well as your Preventive Care list are included within your After Visit Summary for your review. Other Preventive Recommendations:    · A preventive eye exam performed by an eye specialist is recommended every 1-2 years to screen for glaucoma; cataracts, macular degeneration, and other eye disorders. · A preventive dental visit is recommended every 6 months. · Try to get at least 150 minutes of exercise per week or 10,000 steps per day on a pedometer . · Order or download the FREE \"Exercise & Physical Activity: Your Everyday Guide\" from [employer]. Call 1-562.447.9264 or search [employer] online. · You need 5503-6191 mg of calcium and 9636-1325 IU of vitamin D per day. It is possible to meet your calcium requirement with diet alone, but a vitamin D supplement is usually necessary to meet this goal.  · When exposed to the sun, use a sunscreen that protects against both UVA and UVB radiation with an SPF of 30 or greater. Reapply every 2 to 3 hours or after sweating, drying off with a towel, or swimming. · Always wear a seat belt when traveling in a car. Always wear a helmet when riding a bicycle or motorcycle.

## 2021-03-19 ENCOUNTER — TELEPHONE (OUTPATIENT)
Dept: FAMILY MEDICINE CLINIC | Age: 71
End: 2021-03-19

## 2021-03-19 DIAGNOSIS — R53.83 FATIGUE, UNSPECIFIED TYPE: Primary | ICD-10-CM

## 2021-03-19 NOTE — TELEPHONE ENCOUNTER
Patient would like a referral to travis gaytan he is a rheumatologist for fatigue but she does have an appointment with you coming up and said she will talk to you about it but his schedule is full so she would like it to be put in asap

## 2021-04-12 RX ORDER — ATORVASTATIN CALCIUM 80 MG/1
80 TABLET, FILM COATED ORAL DAILY
Qty: 90 TABLET | Refills: 1 | Status: SHIPPED
Start: 2021-04-12 | End: 2021-05-24 | Stop reason: SDUPTHER

## 2021-04-12 RX ORDER — ATENOLOL 50 MG/1
50 TABLET ORAL DAILY
Qty: 90 TABLET | Refills: 1 | Status: SHIPPED
Start: 2021-04-12 | End: 2021-05-24 | Stop reason: SDUPTHER

## 2021-04-12 RX ORDER — EZETIMIBE 10 MG/1
10 TABLET ORAL DAILY
Qty: 90 TABLET | Refills: 1 | Status: SHIPPED
Start: 2021-04-12 | End: 2021-05-24 | Stop reason: SDUPTHER

## 2021-05-24 ENCOUNTER — OFFICE VISIT (OUTPATIENT)
Dept: FAMILY MEDICINE CLINIC | Age: 71
End: 2021-05-24
Payer: MEDICARE

## 2021-05-24 VITALS
SYSTOLIC BLOOD PRESSURE: 139 MMHG | TEMPERATURE: 97.2 F | RESPIRATION RATE: 16 BRPM | BODY MASS INDEX: 26.08 KG/M2 | HEIGHT: 65 IN | OXYGEN SATURATION: 96 % | HEART RATE: 60 BPM | WEIGHT: 156.5 LBS | DIASTOLIC BLOOD PRESSURE: 88 MMHG

## 2021-05-24 DIAGNOSIS — I10 ESSENTIAL HYPERTENSION: Primary | ICD-10-CM

## 2021-05-24 DIAGNOSIS — R05.3 CHRONIC COUGH: ICD-10-CM

## 2021-05-24 DIAGNOSIS — I65.23 ATHEROSCLEROSIS OF BOTH CAROTID ARTERIES: ICD-10-CM

## 2021-05-24 DIAGNOSIS — E78.2 MIXED HYPERLIPIDEMIA: ICD-10-CM

## 2021-05-24 PROCEDURE — 99214 OFFICE O/P EST MOD 30 MIN: CPT | Performed by: FAMILY MEDICINE

## 2021-05-24 RX ORDER — ENALAPRIL MALEATE 10 MG/1
10 TABLET ORAL DAILY
Qty: 90 TABLET | Refills: 3 | Status: SHIPPED
Start: 2021-05-24 | End: 2021-10-11 | Stop reason: SDUPTHER

## 2021-05-24 RX ORDER — ATORVASTATIN CALCIUM 80 MG/1
80 TABLET, FILM COATED ORAL DAILY
Qty: 90 TABLET | Refills: 3 | Status: SHIPPED
Start: 2021-05-24 | End: 2021-12-20 | Stop reason: SDUPTHER

## 2021-05-24 RX ORDER — VALSARTAN 80 MG/1
80 TABLET ORAL DAILY
Qty: 30 TABLET | Refills: 2 | Status: SHIPPED
Start: 2021-05-24 | End: 2021-10-11 | Stop reason: ALTCHOICE

## 2021-05-24 RX ORDER — EZETIMIBE 10 MG/1
10 TABLET ORAL DAILY
Qty: 90 TABLET | Refills: 3 | Status: SHIPPED
Start: 2021-05-24 | End: 2021-12-20 | Stop reason: SDUPTHER

## 2021-05-24 RX ORDER — ATENOLOL 50 MG/1
50 TABLET ORAL DAILY
Qty: 90 TABLET | Refills: 3 | Status: SHIPPED
Start: 2021-05-24 | End: 2021-07-14 | Stop reason: SDUPTHER

## 2021-05-24 NOTE — PROGRESS NOTES
5/24/2021    Chief Complaint   Patient presents with    Medication Refill    Referral - General    Abnormal Chest X-ray       LIT Martinez is a 66 y.o. patient that presents today for:    Hypertension: Patient is here for follow up chronic hypertension. Patient is  compliant with lifestyle modifications. Patient is not well controlled. Patient denies chest pain, diaphoresis, dyspnea, dyspnea on exertion, peripheral edema, palpitations, HA, visual issues. Taking meds as prescribed. No adverse effects. Cough:  Patient is here with complaints of a cough. Onset was many months ago. The cough is not productive. She does not have hemoptysis. Cough is not associated with lying down flat. Patient does have a history of acid reflux. Patient does not have shortness of breath and/or wheezing. She is not a smoker. Patient does not have a history of chronic lung disease and/or environmental exposures. She does not have risk factors for tuberculosis. Patient does not have weight loss, fevers or chills. New medications; No, but is taking enalapril. Patient's past medical, surgical, social and/or family history reviewed, updated in chart, and are non-contributory (unless otherwise stated). Medications and allergies also reviewed and updated in chart.      ROS negative unless otherwise specified    Physical Exam  Temp Readings from Last 3 Encounters:   05/24/21 97.2 °F (36.2 °C) (Temporal)   03/02/21 98 °F (36.7 °C)   02/04/21 97.5 °F (36.4 °C) (Temporal)     Wt Readings from Last 3 Encounters:   05/24/21 156 lb 8 oz (71 kg)   03/02/21 167 lb (75.8 kg)   02/04/21 160 lb (72.6 kg)     BP Readings from Last 3 Encounters:   05/24/21 (!) 149/83   03/02/21 116/76   02/04/21 110/78     Pulse Readings from Last 3 Encounters:   05/24/21 60   03/02/21 71   01/13/21 87       General appearance: alert, well appearing, and in no distress, oriented to person, place, and time and normal appearing weight. CVS exam: normal rate, regular rhythm, normal S1, S2, no murmurs, rubs, clicks or gallops. Radial pulses 2+ bilateral.  PT/DP pulse 2+ bilat. No C/C/E    Chest: clear to auscultation, no wheezes, rales or rhonchi, symmetric air entry. Abdomen: Soft, non-tender, non-distended, positive BS in all 4 quadrants    Extremities:Dorsalis pedis pulses palpated bilaterally, no clubbing, cyanosis, edema or erythema,     SKIN: no lesions, jaundice, petechiae, pallor, cyanosis, ecchymosis    NEURO: gross motor exam normal by observation, gait normal    Mental status - alert, oriented to person, place, and time, normal mood, behavior, speech, dress, motor activity, and thought processes      Assessment/Plan  Balaji Mcgraw was seen today for medication refill, referral - general and abnormal chest x-ray. Diagnoses and all orders for this visit:    Essential hypertension  -     enalapril (VASOTEC) 10 MG tablet; Take 1 tablet by mouth daily    Atherosclerosis of both carotid arteries  -     External Referral To Cardiology    Mixed hyperlipidemia  -     External Referral To Cardiology    Chronic cough  -     XR CHEST (2 VW); Future    Other orders  -     atenolol (TENORMIN) 50 MG tablet; Take 1 tablet by mouth daily  -     atorvastatin (LIPITOR) 80 MG tablet; Take 1 tablet by mouth daily  -     ezetimibe (ZETIA) 10 MG tablet; Take 1 tablet by mouth daily  -     valsartan (DIOVAN) 80 MG tablet; Take 1 tablet by mouth daily HOLD enalapril rx due to cough      HOLD enalapril due to cough. START: Valsartan    No follow-ups on file. Lola Lemon, DO    Call or go to ED immediately if symptoms worsen or persist.    Educational materials and/or home exercises printed for patient's review and were included in patient instructions on his/her After Visit Summary and given to patient at the end of visit.        Counseled regarding above diagnosis, including possible risks and complications,  especially if left uncontrolled. Counseled regarding the possible side effects, risks, benefits and alternatives to treatment; patient and/or guardian verbalizes understanding, agrees, feels comfortable with and wishes to proceed with above treatment plan. Advised patient to call with any new medication issues, and read all Rx info from pharmacy to assure aware of all possible risks and side effects of medication before taking. Reviewed age and gender appropriate health screening exams and vaccinations. Advised patient regarding importance of keeping up with recommended health maintenance and to schedule as soon as possible if overdue, as this is important in assessing for undiagnosed pathology, especially cancer, as well as protecting against potentially harmful/life threatening disease. Patient and/or guardian verbalizes understanding and agrees with above counseling, assessment and plan. All questions answered.

## 2021-06-09 ENCOUNTER — TELEPHONE (OUTPATIENT)
Dept: ADMINISTRATIVE | Age: 71
End: 2021-06-09

## 2021-06-15 ENCOUNTER — OFFICE VISIT (OUTPATIENT)
Dept: ENT CLINIC | Age: 71
End: 2021-06-15
Payer: MEDICARE

## 2021-06-15 VITALS — WEIGHT: 156 LBS | HEIGHT: 65 IN | BODY MASS INDEX: 25.99 KG/M2

## 2021-06-15 DIAGNOSIS — H61.23 BILATERAL IMPACTED CERUMEN: ICD-10-CM

## 2021-06-15 DIAGNOSIS — K21.9 LARYNGOPHARYNGEAL REFLUX (LPR): ICD-10-CM

## 2021-06-15 DIAGNOSIS — R09.82 POST-NASAL DRAINAGE: Primary | ICD-10-CM

## 2021-06-15 PROCEDURE — 69210 REMOVE IMPACTED EAR WAX UNI: CPT | Performed by: NURSE PRACTITIONER

## 2021-06-15 PROCEDURE — 99213 OFFICE O/P EST LOW 20 MIN: CPT | Performed by: NURSE PRACTITIONER

## 2021-06-15 RX ORDER — AZELASTINE 1 MG/ML
1-2 SPRAY, METERED NASAL 2 TIMES DAILY
Qty: 1 BOTTLE | Refills: 1 | Status: SHIPPED
Start: 2021-06-15 | End: 2022-03-02

## 2021-06-15 NOTE — PROGRESS NOTES
[id number] [facility] Otolaryngology  Dr. Tricia Kolb. ROSS Moffett Ms.Ed. New Consult       Patient Name:  Romana Cornelia  :  1950     CHIEF C/O:    Chief Complaint   Patient presents with    New Patient     pt states that she is having a hard time with a lot of phlem and then when taking medication for the dry cough. states that she is always clearing her throat with a small cough states that she has had these symptoms \"forever\"        HISTORY OBTAINED FROM:  patient    HISTORY OF PRESENT ILLNESS:       Sarthak Whitten is a 66y.o. year old female, here today for post nasal drainage and dry cough. Patient states her symptoms have been present for approximately 6 months  She states she has a history of Sjogren's disease  On enalapril for BP and thought this may be because of her chronic cough. Persistent postnasal drainage at night  Plan throat often feels full phlegm  Frequent throat clearing  Hoarseness in the AM and from dry mouth  Denies difficulty swallowing.   No current allergy medications daily, states she takes claritin PRN  No previous sinus infections or prior sinus surgery surgeries  No previous allergy testing  She does admit to having a history of acid reflux and takes Prilosec and Prevacid    Past Medical History:   Diagnosis Date    Adverse effect of female hormone replacement therapy     Dr. Jhony Montilla    Bipolar disorder [facility])     Mixed, has required hospitalization in the past Dr. Allison Christianson     GERD (gastroesophageal reflux disease)     GERD Management per Dr. Katalina Bunch Hyperlipidemia     Management per [facility], clinical Cardiology, Dr. Jonathan Badillo Hypertension     Management per [facility], clinical Cardiology    Hypothyroidism     with nodule - Managed by Dr. Yue Gleason Thyroid disease      Past Surgical History:   Procedure Laterality Date    HYSTERECTOMY      HYSTERECTOMY, TOTAL ABDOMINAL  1979    remote    UPPER GASTROINTESTINAL ENDOSCOPY  2018    Dr. Franchesca Kearney Marybeth Michaels       Current Outpatient Medications:     aluminum hydroxide-magnesium carbonate (GAVISCON)  MG/15ML suspension, Take 15 mLs by mouth nightly, Disp: 450 mL, Rfl: 0    azelastine (ASTELIN) 0.1 % nasal spray, 1-2 sprays by Nasal route 2 times daily Use in each nostril as directed, Disp: 1 Bottle, Rfl: 1    enalapril (VASOTEC) 10 MG tablet, Take 1 tablet by mouth daily, Disp: 90 tablet, Rfl: 3    atenolol (TENORMIN) 50 MG tablet, Take 1 tablet by mouth daily, Disp: 90 tablet, Rfl: 3    atorvastatin (LIPITOR) 80 MG tablet, Take 1 tablet by mouth daily, Disp: 90 tablet, Rfl: 3    ezetimibe (ZETIA) 10 MG tablet, Take 1 tablet by mouth daily, Disp: 90 tablet, Rfl: 3    estradiol (VIVELLE) 0.1 MG/24HR, APPLY 1 PATCH TO SKIN TWICE A WEEK, Disp: 24 patch, Rfl: 2    progesterone (PROMETRIUM) 200 MG capsule, Take 1 capsule by mouth nightly, Disp: 90 capsule, Rfl: 2    Pregnenolone Micronized POWD, Pregnenolone SR 20mg and 7-keto-DHEA SR 7.5mg one tab po q d (Disp: 3 mo supply) 1bottle=1month, Disp: 3 Bottle, Rfl: 1    levothyroxine (SYNTHROID) 75 MCG tablet, Take 1 tablet every day Mon-Fri, Disp: 22 tablet, Rfl: 5    QUEtiapine (SEROQUEL XR) 50 MG extended release tablet, Take 50 mg by mouth every morning , Disp: , Rfl:     famotidine (PEPCID) 40 MG tablet, Take 40 mg by mouth daily, Disp: , Rfl:     loratadine (CLARITIN REDITABS) 10 MG dissolvable tablet, Take 10 mg by mouth daily, Disp: , Rfl:     Handicap Placard MISC, by Does not apply route, Disp: , Rfl:     Cholecalciferol (VITAMIN D3) 1000 units CAPS, Take by mouth daily Take 1 by mouth daily, Disp: , Rfl:     Omega-3 Fatty Acids (FISH OIL) 1200 MG CAPS, Take by mouth daily Take 2 by mouth everyday, Disp: , Rfl:     docusate sodium (COLACE) 100 MG capsule, Take 100 mg by mouth daily Col-Rite, Disp: , Rfl:     [place] GINSENG PO, Take by mouth as needed , Disp: , Rfl:     Multiple Vitamins-Minerals (CENTRUM CARDIO) TABS, Take 1 tablet by mouth daily , Disp: , Rfl:     DHA-EPA-VITAMIN E PO, Take 4 capsules by mouth Mon- Wed- Friday, Disp: , Rfl:     Coenzyme Q-10 200 MG CAPS, Take 800 mg by mouth daily , Disp: , Rfl:     Ascorbic Acid (VITAMIN C) 100 MG tablet, Take 100 mg by mouth Twice weekly, Disp: , Rfl:     Ergocalciferol (VITAMIN D2 PO), Take 400 Units by mouth daily , Disp: , Rfl:     ALPRAZolam (XANAX) 0.5 MG tablet, Take 0.5 mg by mouth as needed. ., Disp: , Rfl:     lansoprazole (PREVACID) 15 MG capsule, Take 15 mg by mouth daily , Disp: , Rfl:     QUEtiapine (SEROQUEL XR) 200 MG XR tablet, Take 200 mg by mouth nightly , Disp: , Rfl:     venlafaxine (EFFEXOR XR) 75 MG XR capsule, Take 75 mg by mouth daily , Disp: , Rfl:     vitamin E 400 UNIT capsule, Take by mouth, Disp: , Rfl:     Estradiol (IMVEXXY MAINTENANCE PACK) 10 MCG INST, Place 1 capsule vaginally Twice a Week, Disp: 8 each, Rfl: 5    valsartan (DIOVAN) 80 MG tablet, Take 1 tablet by mouth daily HOLD enalapril rx due to cough (Patient not taking: Reported on 6/15/2021), Disp: 30 tablet, Rfl: 2    nystatin (MYCOSTATIN) 857865 UNIT/GM powder, Apply 3 times daily. (Patient not taking: Reported on 6/15/2021), Disp: 1 Bottle, Rfl: 0    methylcellulose 500 MG TABS, 1 qd, Disp: , Rfl:   Codeine, Ampicillin, Meperidine, Meperidine hcl, Morphine, Nitrofurantoin, Penicillin g, Penicillins, Sulfa antibiotics, and Vancomycin  Social History     Tobacco Use    Smoking status: Never Smoker    Smokeless tobacco: Never Used   Vaping Use    Vaping Use: Never used   Substance Use Topics    Alcohol use:  Yes     Alcohol/week: 0.0 standard drinks     Comment: socially    Drug use: No     Family History   Problem Relation Age of Onset    Heart Disease Paternal Grandmother     Heart Disease Father         Aortic aneurysm (both his sisters and his mother as well)    Heart Disease Mother     No Known Problems Daughter     Prostate Cancer Son        Review of Systems Constitutional: Negative. Negative for activity change and appetite change. HENT: Positive for congestion, postnasal drip, rhinorrhea and sore throat. Negative for trouble swallowing. Globus sensation   Eyes: Negative. Respiratory: Negative. Negative for shortness of breath and stridor. Cardiovascular: Negative. Negative for chest pain and palpitations. Endocrine: Negative. Musculoskeletal: Negative. Skin: Negative. Neurological: Negative. Negative for dizziness. Hematological: Negative. Psychiatric/Behavioral: Negative. Ht 5' 4.5\" (1.638 m)   Wt 156 lb (70.8 kg)   BMI 26.36 kg/m²   Physical Exam  Constitutional:       Appearance: Normal appearance. HENT:      Head: Normocephalic. Right Ear: Tympanic membrane, ear canal and external ear normal. There is impacted cerumen. Left Ear: Tympanic membrane, ear canal and external ear normal. There is impacted cerumen. Nose: No rhinorrhea. Right Turbinates: Pale. Left Turbinates: Pale. Mouth/Throat:      Lips: Pink. Mouth: Mucous membranes are moist.     Eyes:      Conjunctiva/sclera: Conjunctivae normal.      Pupils: Pupils are equal, round, and reactive to light. Cardiovascular:      Rate and Rhythm: Normal rate and regular rhythm. Pulses: Normal pulses. Pulmonary:      Effort: Pulmonary effort is normal. No respiratory distress. Breath sounds: No stridor. Musculoskeletal:         General: Normal range of motion. Cervical back: Normal range of motion. No rigidity. No muscular tenderness. Skin:     General: Skin is warm and dry. Neurological:      General: No focal deficit present. Mental Status: She is alert and oriented to person, place, and time. Psychiatric:         Mood and Affect: Mood normal.         Behavior: Behavior normal.         Thought Content:  Thought content normal.         Judgment: Judgment normal.         IMPRESSION/PLAN:    Cerumen removal     Auditory canal(s) both ears completely obstructed with cerumen. Cerumen was gently removed using soft plastic curette, gentle irrigation. Tympanic membranes are intact following the procedure. Auditory canals appear normal.          Rosalee Currie was seen today for new patient. Diagnoses and all orders for this visit:    Post-nasal drainage    Laryngopharyngeal reflux (LPR)    Bilateral impacted cerumen  -     WA REMOVAL IMPACTED CERUMEN INSTRUMENTATION UNILAT    Other orders  -     aluminum hydroxide-magnesium carbonate (GAVISCON)  MG/15ML suspension; Take 15 mLs by mouth nightly  -     azelastine (ASTELIN) 0.1 % nasal spray; 1-2 sprays by Nasal route 2 times daily Use in each nostril as directed      Patient will begin taking Astelin spray, 1 to 2 sprays each nostril twice daily as needed for postnasal drainage and congestion symptoms. Also recommended she begin using nasal saline several times daily. She is started to begin using Gaviscon, 15 mL once daily at bedtime for possible LPR and GERD symptoms. Bilateral cerumen impactions were removed without difficulty. She will follow-up in 1 month. Without significant improvement of her symptoms is recommended that patient undergo a scope in the office. She agrees to this plan. She will call for any new or worsening symptoms prior to her next appointment.         Bella Restrepo, MSN, FNP-C  8 [facility], Nose and Throat    The information contained in this note has been dictated using drug and medical speech recognition software and may contain errors

## 2021-07-08 ASSESSMENT — ENCOUNTER SYMPTOMS
SORE THROAT: 1
EYES NEGATIVE: 1
RESPIRATORY NEGATIVE: 1
TROUBLE SWALLOWING: 0
SHORTNESS OF BREATH: 0
RHINORRHEA: 1
STRIDOR: 0

## 2021-07-14 ENCOUNTER — TELEPHONE (OUTPATIENT)
Dept: FAMILY MEDICINE CLINIC | Age: 71
End: 2021-07-14

## 2021-07-14 RX ORDER — ATENOLOL 50 MG/1
50 TABLET ORAL DAILY
Qty: 90 TABLET | Refills: 3 | Status: SHIPPED
Start: 2021-07-14 | End: 2021-12-20 | Stop reason: SDUPTHER

## 2021-07-14 NOTE — TELEPHONE ENCOUNTER
Patient called asking for a refill of Tenormin. Patient stated she would like at least 3 refills to get her through until her next appointment.

## 2021-09-01 ENCOUNTER — HOSPITAL ENCOUNTER (OUTPATIENT)
Age: 71
Discharge: HOME | End: 2021-09-01
Payer: MEDICARE

## 2021-09-01 DIAGNOSIS — Z79.890 HORMONE REPLACEMENT THERAPY: ICD-10-CM

## 2021-09-01 DIAGNOSIS — E03.8 HYPOTHYROIDISM DUE TO HASHIMOTO'S THYROIDITIS: ICD-10-CM

## 2021-09-01 DIAGNOSIS — E03.9 ACQUIRED HYPOTHYROIDISM: ICD-10-CM

## 2021-09-01 DIAGNOSIS — E55.9 VITAMIN D DEFICIENCY, UNSPECIFIED: ICD-10-CM

## 2021-09-01 DIAGNOSIS — E27.9 ADRENAL GLAND DYSFUNCTION (HCC): ICD-10-CM

## 2021-09-01 DIAGNOSIS — E78.2 MIXED HYPERLIPIDEMIA: ICD-10-CM

## 2021-09-01 DIAGNOSIS — E06.3 HYPOTHYROIDISM DUE TO HASHIMOTO'S THYROIDITIS: ICD-10-CM

## 2021-09-01 LAB
ALBUMIN SERPL-MCNC: 4.6 G/DL (ref 3.5–5.2)
ALP BLD-CCNC: 53 U/L (ref 35–104)
ALT SERPL-CCNC: 32 U/L (ref 0–32)
ANION GAP SERPL CALCULATED.3IONS-SCNC: 11 MMOL/L (ref 7–16)
AST SERPL-CCNC: 37 U/L (ref 0–31)
BILIRUB SERPL-MCNC: 0.5 MG/DL (ref 0–1.2)
BUN BLDV-MCNC: 13 MG/DL (ref 6–23)
CALCIUM SERPL-MCNC: 9.4 MG/DL (ref 8.6–10.2)
CHLORIDE BLD-SCNC: 101 MMOL/L (ref 98–107)
CHOLESTEROL, TOTAL: 180 MG/DL (ref 0–199)
CO2: 23 MMOL/L (ref 22–29)
CORTISOL TOTAL: 9.05 MCG/DL (ref 2.68–18.4)
CREAT SERPL-MCNC: 0.6 MG/DL (ref 0.5–1)
ESTRADIOL LEVEL: 89.8 PG/ML
GFR AFRICAN AMERICAN: >60
GFR NON-AFRICAN AMERICAN: >60 ML/MIN/1.73
GLUCOSE BLD-MCNC: 100 MG/DL (ref 74–99)
HCT VFR BLD CALC: 41.9 % (ref 34–48)
HDLC SERPL-MCNC: 61 MG/DL
HEMOGLOBIN: 14.2 G/DL (ref 11.5–15.5)
LDL CHOLESTEROL CALCULATED: 98 MG/DL (ref 0–99)
MCH RBC QN AUTO: 31.1 PG (ref 26–35)
MCHC RBC AUTO-ENTMCNC: 33.9 % (ref 32–34.5)
MCV RBC AUTO: 91.7 FL (ref 80–99.9)
PDW BLD-RTO: 13 FL (ref 11.5–15)
PLATELET # BLD: 240 E9/L (ref 130–450)
PMV BLD AUTO: 9.6 FL (ref 7–12)
POTASSIUM SERPL-SCNC: 4 MMOL/L (ref 3.5–5)
RBC # BLD: 4.57 E12/L (ref 3.5–5.5)
SODIUM BLD-SCNC: 135 MMOL/L (ref 132–146)
T3 FREE: 2.4 PG/ML (ref 2–4.4)
T4 FREE: 1.1 NG/DL (ref 0.93–1.7)
TOTAL PROTEIN: 6.9 G/DL (ref 6.4–8.3)
TRIGL SERPL-MCNC: 106 MG/DL (ref 0–149)
TSH SERPL DL<=0.05 MIU/L-ACNC: 0.97 UIU/ML (ref 0.27–4.2)
VITAMIN D 25-HYDROXY: 40 NG/ML (ref 30–100)
VLDLC SERPL CALC-MCNC: 21 MG/DL
WBC # BLD: 5.5 E9/L (ref 4.5–11.5)

## 2021-09-01 PROCEDURE — 80053 COMPREHEN METABOLIC PANEL: CPT

## 2021-09-01 PROCEDURE — 82306 VITAMIN D 25 HYDROXY: CPT

## 2021-09-01 PROCEDURE — 85027 COMPLETE CBC AUTOMATED: CPT

## 2021-09-01 PROCEDURE — 80061 LIPID PANEL: CPT

## 2021-09-01 PROCEDURE — 82670 ASSAY OF TOTAL ESTRADIOL: CPT

## 2021-09-01 PROCEDURE — 82533 TOTAL CORTISOL: CPT

## 2021-09-01 PROCEDURE — 36415 COLL VENOUS BLD VENIPUNCTURE: CPT

## 2021-09-01 PROCEDURE — 84140 ASSAY OF PREGNENOLONE: CPT

## 2021-09-01 PROCEDURE — 84443 ASSAY THYROID STIM HORMONE: CPT

## 2021-09-01 PROCEDURE — 84144 ASSAY OF PROGESTERONE: CPT

## 2021-09-01 PROCEDURE — 84481 FREE ASSAY (FT-3): CPT

## 2021-09-01 PROCEDURE — 84439 ASSAY OF FREE THYROXINE: CPT

## 2021-09-01 PROCEDURE — 82627 DEHYDROEPIANDROSTERONE: CPT

## 2021-09-03 LAB
DHEAS (DHEA SULFATE): 3 UG/DL (ref 10–90)
PROGESTERONE LEVEL: 4.18 NG/ML

## 2021-09-05 LAB — PREGNENOLONE: 23 NG/DL (ref 15–132)

## 2021-09-08 ENCOUNTER — OFFICE VISIT (OUTPATIENT)
Dept: PODIATRY | Age: 71
End: 2021-09-08
Payer: MEDICARE

## 2021-09-08 VITALS
BODY MASS INDEX: 26.53 KG/M2 | DIASTOLIC BLOOD PRESSURE: 64 MMHG | TEMPERATURE: 98.2 F | SYSTOLIC BLOOD PRESSURE: 110 MMHG | WEIGHT: 157 LBS

## 2021-09-08 DIAGNOSIS — M79.5 FOREIGN BODY (FB) IN SOFT TISSUE: Primary | ICD-10-CM

## 2021-09-08 PROCEDURE — 99203 OFFICE O/P NEW LOW 30 MIN: CPT | Performed by: PODIATRIST

## 2021-09-08 RX ORDER — ROSUVASTATIN CALCIUM 10 MG/1
TABLET, COATED ORAL DAILY
COMMUNITY
End: 2021-10-11 | Stop reason: ALTCHOICE

## 2021-09-08 NOTE — PROGRESS NOTES
[facility] PODIATRY  [address] [facility] [place] [address]  Dept: [phone]  Dept Fax: [phone]    NEW PATIENT PROGRESS NOTE  Date of patient's visit: 9/8/2021  Patient's Name:  Jamie Ramírez YOB: 1950            Patient Care Team:  Tim Miles DO as PCP - General (Family Medicine)  Tim Miles DO as PCP - [facility] Empaneled Provider        Chief Complaint   Patient presents with   Uri Hammond Doctor     referred herself she is logan Childers relation     Toe Pain     saw pcp Dr. Tim Miles 6/16/2021 she believes she has a FB Splinter in her left great toenail     Nail Problem       HPI  HPI:   Jamie Ramírez is a 66 y.o. female who presents to the office today complaining of left great toenail pain. This new patient is seen today regarding left great toenail. Approximately 4 months ago the patient was working without shoes in March patient thinks she got something under the nail painful. Allergies   Allergen Reactions    Codeine Hives and Shortness Of Breath    Ampicillin Hives     SOB    Meperidine      reaction with Parkinson's meds.     Meperidine Hcl     Morphine Other (See Comments)     \"psychotic\"    Nitrofurantoin Hives     SOB    Penicillin G     Penicillins Hives     SOB    Sulfa Antibiotics Hives     SOB    Vancomycin Hives     SOB       Past Medical History:   Diagnosis Date    Adverse effect of female hormone replacement therapy     Dr. Demi Solorio    Bipolar disorder [facility])     Mixed, has required hospitalization in the past Dr. Krystal Gagnon Depression     GERD (gastroesophageal reflux disease)     GERD Management per Dr. Evelina Melo Hyperlipidemia     Management per [facility], clinical Cardiology, Dr. Ayan Alcazar Hypertension     Management per [facility], clinical Cardiology    Hypothyroidism     with nodule - Managed by Dr. Zohra Sullivan Thyroid disease        Prior to Admission medications    Medication Sig Start Date End Date Taking? Authorizing Provider   rosuvastatin (CRESTOR) 10 MG tablet Take by mouth daily   Yes Historical Provider, MD   atenolol (TENORMIN) 50 MG tablet Take 1 tablet by mouth daily 7/14/21  Yes Lola Miles DO   Estradiol (IMVEXXY MAINTENANCE PACK) 10 MCG INST Place 1 capsule vaginally Twice a Week 6/17/21  Yes Lupe Marcus PA-C   azelastine (ASTELIN) 0.1 % nasal spray 1-2 sprays by Nasal route 2 times daily Use in each nostril as directed 6/15/21  Yes Marta Males, APRN - CNP   enalapril (VASOTEC) 10 MG tablet Take 1 tablet by mouth daily 5/24/21  Yes Lola Miles DO   ezetimibe (ZETIA) 10 MG tablet Take 1 tablet by mouth daily 5/24/21  Yes Lola Miles DO   valsartan (DIOVAN) 80 MG tablet Take 1 tablet by mouth daily HOLD enalapril rx due to cough 5/24/21  Yes Lola Miles DO   nystatin (MYCOSTATIN) 396554 UNIT/GM powder Apply 3 times daily.  3/2/21  Yes Lola Miles DO   estradiol (VIVELLE) 0.1 MG/24HR APPLY 1 PATCH TO SKIN TWICE A WEEK 2/4/21  Yes Zenon Moser PA-C   progesterone (PROMETRIUM) 200 MG capsule Take 1 capsule by mouth nightly 2/4/21  Yes Zenon Moser PA-C   Pregnenolone Micronized POWD Pregnenolone SR 20mg and 7-keto-DHEA SR 7.5mg one tab po q d (Disp: 3 mo supply) 1bottle=1month 2/4/21  Yes Zenon Moser PA-C   levothyroxine (SYNTHROID) 75 MCG tablet Take 1 tablet every day Mon-Fri 11/17/20  Yes Bakari Monroe MD   QUEtiapine (SEROQUEL XR) 50 MG extended release tablet Take 50 mg by mouth every morning  1/22/20  Yes Historical Provider, MD   famotidine (PEPCID) 40 MG tablet Take 40 mg by mouth daily   Yes Historical Provider, MD   loratadine (CLARITIN REDITABS) 10 MG dissolvable tablet Take 10 mg by mouth daily   Yes Historical Provider, MD   Handicap Placard MISC by Does not apply route   Yes Historical Provider, MD   Cholecalciferol (VITAMIN D3) 1000 units CAPS Take by mouth daily Take 1 by mouth daily   Yes Historical Provider, MD   Omega-3 Fatty Acids (FISH OIL) 1200 MG CAPS Take by mouth daily Take 2 by mouth everyday   Yes Historical Provider, MD   docusate sodium (COLACE) 100 MG capsule Take 100 mg by mouth daily Col-Rite 4/24/12  Yes Historical Provider, MD Larsen Penta GINSENG PO Take by mouth as needed  6/9/09  Yes Historical Provider, MD   Multiple Vitamins-Minerals (CENTRUM CARDIO) TABS Take 1 tablet by mouth daily  9/25/12  Yes Historical Provider, MD   DHA-EPA-VITAMIN E PO Take 4 capsules by mouth Mon- Wed- Friday 4/24/12  Yes Historical Provider, MD   Coenzyme Q-10 200 MG CAPS Take 800 mg by mouth daily  4/24/12  Yes Historical Provider, MD   Ascorbic Acid (VITAMIN C) 100 MG tablet Take 100 mg by mouth Twice weekly   Yes Historical Provider, MD   methylcellulose 500 MG TABS 1 qd   Yes Historical Provider, MD   Ergocalciferol (VITAMIN D2 PO) Take 400 Units by mouth daily  9/25/12  Yes Historical Provider, MD   ALPRAZolam (XANAX) 0.5 MG tablet Take 0.5 mg by mouth as needed.  . 4/3/16  Yes Historical Provider, MD   lansoprazole (PREVACID) 15 MG capsule Take 15 mg by mouth daily  10/5/10  Yes Historical Provider, MD   QUEtiapine (SEROQUEL XR) 200 MG XR tablet Take 200 mg by mouth nightly  4/24/12  Yes Historical Provider, MD   venlafaxine (EFFEXOR XR) 75 MG XR capsule Take 75 mg by mouth daily  10/5/10  Yes Historical Provider, MD   vitamin E 400 UNIT capsule Take by mouth 10/24/06  Yes Historical Provider, MD   atorvastatin (LIPITOR) 80 MG tablet Take 1 tablet by mouth daily  Patient not taking: Reported on 9/8/2021 5/24/21   Malissa Miles DO       Past Surgical History:   Procedure Laterality Date    HYSTERECTOMY      HYSTERECTOMY, TOTAL ABDOMINAL  1979    remote    UPPER GASTROINTESTINAL ENDOSCOPY  02/23/2018    Dr. Stevie Charles       Family History   Problem Relation Age of Onset    Heart Disease Paternal Grandmother     Heart Disease Father         Aortic aneurysm (both his sisters and his mother as well)    Heart Disease Mother     No Known Problems Daughter     Prostate Cancer Son        Social History     Tobacco Use    Smoking status: Never Smoker    Smokeless tobacco: Never Used   Substance Use Topics    Alcohol use: Yes     Alcohol/week: 0.0 standard drinks     Comment: socially       Review of Systems    Review of Systems:   History obtained from chart review and the patient  General ROS: negative for - chills, fatigue, fever, night sweats or weight gain  Constitutional: Negative for chills, diaphoresis, fatigue, fever and unexpected weight change. Musculoskeletal: Positive for . Neurological ROS: negative for - behavioral changes, confusion, headaches or seizures. Negative for weakness and numbness. Dermatological ROS: negative for - mole changes, rash  Cardiovascular: Negative for leg swelling. Gastrointestinal: Negative for constipation, diarrhea, nausea and vomiting. Lower Extremity Physical Examination:   Vitals:   Vitals:    09/08/21 1439   BP: 110/64   Temp: 98.2 °F (36.8 °C)        Foot Exam  Ortho Exam    General: AAO x 3 in NAD. Dermatologic Exam: The left hallux on the distal middle aspect of the nail there is a small discoloration with debridement it was noted that this was a small fungal tissue there was no foreign body noted  Skin lesion/ulceration Absent . Skin No rashes or nodules noted. .   Musculoskeletal:     1st MPJ ROM within normal limits, Bilateral.    Ankle ROM within normal limits,Bilateral.   HEEL PAIN      TARSAL TUNNEL PAIN      Vascular:     Radiographs:  3 views x-ray shows hallux limitus and rigidus with first metatarsophalangeal joint no foreign bodies no fractures noted distal phalanx foot/ankle:     Asessment: Patient is a 66 y.o. female with:    Diagnosis Orders   1. Foreign body (FB) in soft tissue  XR FOOT LEFT (MIN 3 VIEWS)       Plan: Patient examined and evaluated.   Current condition and treatment options discussed in detail. Discussed conservative and surgical options with the patient. Treatment options today consisted of verbal and written instructions given to patient. Contact office with any questions/problems/concerns. RTC in 4week(s).   Today I reviewed the x-ray no foreign body or fracture noted patient had no foreign body when debridement of the nail Neosporin Band-Aid and soaking for 2 weeks reappoint if pain persist.  There was no exposed bone or tendon after debridement there was no drainage or erythematous noted on the nailbed  9/8/2021    Electronically signed by Harman Young DPM on 9/8/2021 at 3:30 PM  9/8/2021

## 2021-10-11 ENCOUNTER — OFFICE VISIT (OUTPATIENT)
Dept: FAMILY MEDICINE CLINIC | Age: 71
End: 2021-10-11
Payer: MEDICARE

## 2021-10-11 VITALS
TEMPERATURE: 97.2 F | WEIGHT: 160 LBS | HEIGHT: 65 IN | OXYGEN SATURATION: 98 % | SYSTOLIC BLOOD PRESSURE: 143 MMHG | RESPIRATION RATE: 18 BRPM | DIASTOLIC BLOOD PRESSURE: 75 MMHG | HEART RATE: 56 BPM | BODY MASS INDEX: 26.66 KG/M2

## 2021-10-11 DIAGNOSIS — E78.2 MIXED HYPERLIPIDEMIA: ICD-10-CM

## 2021-10-11 DIAGNOSIS — E03.9 ACQUIRED HYPOTHYROIDISM: ICD-10-CM

## 2021-10-11 DIAGNOSIS — I10 ESSENTIAL HYPERTENSION: Primary | ICD-10-CM

## 2021-10-11 DIAGNOSIS — R73.9 ELEVATED BLOOD SUGAR: ICD-10-CM

## 2021-10-11 DIAGNOSIS — M35.01 SJOGREN'S SYNDROME WITH KERATOCONJUNCTIVITIS SICCA (HCC): ICD-10-CM

## 2021-10-11 PROCEDURE — 90694 VACC AIIV4 NO PRSRV 0.5ML IM: CPT | Performed by: FAMILY MEDICINE

## 2021-10-11 PROCEDURE — 99214 OFFICE O/P EST MOD 30 MIN: CPT | Performed by: FAMILY MEDICINE

## 2021-10-11 PROCEDURE — G0008 ADMIN INFLUENZA VIRUS VAC: HCPCS | Performed by: FAMILY MEDICINE

## 2021-10-11 RX ORDER — ENALAPRIL MALEATE 20 MG/1
20 TABLET ORAL DAILY
Qty: 90 TABLET | Refills: 1 | Status: SHIPPED
Start: 2021-10-11 | End: 2021-12-20 | Stop reason: SINTOL

## 2021-10-11 NOTE — PROGRESS NOTES
10/11/2021    Chief Complaint   Patient presents with    6 Month Follow-Up    Discuss Labs    Medication Refill     osteo script     Hypertension: Patient is here for follow up chronic hypertension. Patient is  compliant with lifestyle modifications like exercise and adherence to a low salt diet. Patient is not well controlled. Patient denies chest pain, diaphoresis, dyspnea, dyspnea on exertion, peripheral edema, palpitations, HA, visual issues. Med list reviewed. Taking as prescribed. No adverse effects. Hyperlipidemia:  Patient presents with hyperlipidemia. Is asymptomatic. This is a chronic problem. Patient is  well controlled, as reviewed and seen on most recent labs. Compliance with treatment thus far has been adequate. No adverse effects. Hypothyroidism:  Patient is here today to follow up chronic hypothyroidism. This problem is longstanding. This is   generally controlled on current medication regimen, Levothyroxine 75 mcg. Takes medication as directed and tolerates well. Denies fatigue, changes in skin, hair or nails, unintentional weight loss or gain. remarkable. TSH:    Lab Results   Component Value Date    TSH 0.975 09/01/2021          Patient's past medical, surgical, social and/or family history reviewed, updated in chart, and are non-contributory (unless otherwise stated). Medications and allergies also reviewed and updated in chart.     ROS negative unless otherwise specified    Physical Exam  Wt Readings from Last 3 Encounters:   10/11/21 160 lb (72.6 kg)   09/22/21 161 lb 9.6 oz (73.3 kg)   09/08/21 157 lb (71.2 kg)     Temp Readings from Last 3 Encounters:   10/11/21 97.2 °F (36.2 °C) (Temporal)   09/08/21 98.2 °F (36.8 °C) (Temporal)   05/24/21 97.2 °F (36.2 °C) (Temporal)     BP Readings from Last 3 Encounters:   10/11/21 (!) 143/75   09/22/21 112/70   09/08/21 110/64     Pulse Readings from Last 3 Encounters:   10/11/21 56   05/24/21 60   03/02/21 71       General appearance: alert, well appearing, and in no distress, oriented to person, place, and time and normal appearing weight. CVS exam: normal rate, regular rhythm, normal S1, S2, no murmurs, rubs, clicks or gallops. Radial pulses 2+ bilateral.  PT/DP pulse 2+ bilat. No C/C/E    Chest: clear to auscultation, no wheezes, rales or rhonchi, symmetric air entry. Abdomen: Soft, non-tender, non-distended, positive BS in all 4 quadrants    Extremities:Dorsalis pedis pulses palpated bilaterally, no clubbing, cyanosis, edema or erythema, Sensory exam of the foot is normal, tested with the monofilament. Good pulses, no lesions or ulcers, good peripheral pulses. SKIN: warm, dry, no lesions, jaundice, petechiae, pallor, cyanosis, ecchymosis    NEURO: gross motor exam normal by observation, gait normal    Mental status - alert, oriented to person, place, and time, normal mood, behavior, speech, dress, motor activity, and thought processes      Assessment/Plan  Maia Nesbitt was seen today for 6 month follow-up, discuss labs and medication refill. Diagnoses and all orders for this visit:    Essential hypertension  -     enalapril (VASOTEC) 20 MG tablet; Take 1 tablet by mouth daily  -     CBC; Future  -     Comprehensive Metabolic Panel; Future    Mixed hyperlipidemia  -     Lipid Panel; Future    Acquired hypothyroidism  -     TSH without Reflex; Future    Elevated blood sugar  -     Hemoglobin A1C; Future    S[facility]ogren's [facility] with [facility]oconjunctivitis [facility]ca [facility]  -     [address], Rheumatology, [place]    Other orders  -     INFLUENZA, QUADV, ADJUVANTED, 65 YRS =, IM, PF, PREFILL SYR, 0.5ML (FLUAD)        Return in about 6 months (around 4/11/2022), or if symptoms worsen or fail to improve.       Call or go to ED immediately if symptoms worsen or persist.    Educational materials and/or home exercises printed for patient's review and were included in patient instructions on his/her After Visit Summary and given to patient at the end of visit. Counseled regarding above diagnosis, including possible risks and complications,  especially if left uncontrolled. Counseled regarding the possible side effects, risks, benefits and alternatives to treatment; patient and/or guardian verbalizes understanding, agrees, feels comfortable with and wishes to proceed with above treatment plan. Advised patient to call with any new medication issues, and read all Rx info from pharmacy to assure aware of all possible risks and side effects of medication before taking. Reviewed age and gender appropriate health screening exams and vaccinations. Advised patient regarding importance of keeping up with recommended health maintenance and to schedule as soon as possible if overdue, as this is important in assessing for undiagnosed pathology, especially cancer, as well as protecting against potentially harmful/life threatening disease. Patient and/or guardian verbalizes understanding and agrees with above counseling, assessment and plan. All questions answered.     Lola Lemon, DO

## 2021-10-12 ENCOUNTER — OFFICE VISIT (OUTPATIENT)
Dept: ENDOCRINOLOGY | Age: 71
End: 2021-10-12
Payer: MEDICARE

## 2021-10-12 VITALS
RESPIRATION RATE: 18 BRPM | HEIGHT: 65 IN | WEIGHT: 159 LBS | HEART RATE: 59 BPM | SYSTOLIC BLOOD PRESSURE: 127 MMHG | OXYGEN SATURATION: 96 % | BODY MASS INDEX: 26.49 KG/M2 | DIASTOLIC BLOOD PRESSURE: 78 MMHG

## 2021-10-12 DIAGNOSIS — E06.3 HYPOTHYROIDISM DUE TO HASHIMOTO'S THYROIDITIS: ICD-10-CM

## 2021-10-12 DIAGNOSIS — R73.9 HYPERGLYCEMIA: ICD-10-CM

## 2021-10-12 DIAGNOSIS — E03.8 HYPOTHYROIDISM DUE TO HASHIMOTO'S THYROIDITIS: ICD-10-CM

## 2021-10-12 DIAGNOSIS — R79.89 LOW SERUM CORTISOL LEVEL: Primary | ICD-10-CM

## 2021-10-12 DIAGNOSIS — E04.2 MULTINODULAR GOITER: ICD-10-CM

## 2021-10-12 LAB — HBA1C MFR BLD: 5.4 %

## 2021-10-12 PROCEDURE — 99214 OFFICE O/P EST MOD 30 MIN: CPT | Performed by: INTERNAL MEDICINE

## 2021-10-12 PROCEDURE — 83036 HEMOGLOBIN GLYCOSYLATED A1C: CPT | Performed by: INTERNAL MEDICINE

## 2021-10-12 RX ORDER — COSYNTROPIN 0.25 MG/ML
0.25 INJECTION, POWDER, FOR SOLUTION INTRAMUSCULAR; INTRAVENOUS ONCE
Qty: 1 EACH | Refills: 0 | Status: SHIPPED | OUTPATIENT
Start: 2021-10-12 | End: 2021-10-12

## 2021-10-12 RX ORDER — LEVOTHYROXINE SODIUM 0.07 MG/1
TABLET ORAL
Qty: 90 TABLET | Refills: 3 | Status: SHIPPED
Start: 2021-10-12 | End: 2022-10-10 | Stop reason: SDUPTHER

## 2021-10-12 NOTE — PROGRESS NOTES
[address] [place] [address] Department of Endocrinology Diabetes and Metabolism   [address] [facility] [address]   Phone: [phone]  Fax: [phone]    Date of Service: 10/12/2021  Primary Care Physician: Nestor Robledo DO  Provider: Randa Marr MD        Reason for the visit:  Primary Hypothyroidism    History of Present Illness: The history is provided by the patient. No  was used. Accuracy of the patient data is excellent. Tricia Welch is a very pleasant 66 y.o. female seen today for management of hypothyroidism     The patient was diagnosed with hypothyroidism 5 years ago and since then has been on thyroid hormones replacement. The patient is currently on 75 mcg daily 1 tab 5 days a week and none on sautruday and Sunday     Patient denied any history of  swelling in the area of the thyroid gland, weight loss, change in appetite, nervousness, anxiety, irritability, tremor, sweating, heat intolerance, changes in bowel habits, muscle weakness or difficulty sleeping.     Fasting BG was 100     PAST MEDICAL HISTORY   Past Medical History:   Diagnosis Date    Adverse effect of female hormone replacement therapy     Dr. Miller [place]    Bipolar disorder [facility])     Mixed, has required hospitalization in the past Dr. Cervantes Memory Depression     GERD (gastroesophageal reflux disease)     GERD Management per Dr. Arabella Harden Hyperlipidemia     Management per [facility], clinical Cardiology, Dr. Radha Harp Hypertension     Management per [facility], clinical Cardiology    Hypothyroidism     with nodule - Managed by Dr. Judson Johnson Sjogren's syndrome with keratoconjunctivitis sicca ([facility] [address]     Thyroid disease        PAST SURGICAL HISTORY   Past Surgical History:   Procedure Laterality Date    HYSTERECTOMY      HYSTERECTOMY, TOTAL ABDOMINAL  1979    remote    UPPER GASTROINTESTINAL ENDOSCOPY  02/23/2018    Dr. Cliff Parker MG/24HR APPLY 1 PATCH TO SKIN TWICE A WEEK 24 patch 2    progesterone (PROMETRIUM) 200 MG capsule Take 1 capsule by mouth nightly 90 capsule 2    Pregnenolone Micronized POWD Pregnenolone SR 20mg and 7-keto-DHEA SR 7.5mg one tab po q d (Disp: 3 mo supply) 1bottle=1month 3 Bottle 1    QUEtiapine (SEROQUEL XR) 50 MG extended release tablet Take 50 mg by mouth every morning       famotidine (PEPCID) 40 MG tablet Take 40 mg by mouth daily      loratadine (CLARITIN REDITABS) 10 MG dissolvable tablet Take 10 mg by mouth daily      Handicap Placard MISC by Does not apply route      Cholecalciferol (VITAMIN D3) 1000 units CAPS Take by mouth daily Take 1 by mouth daily      Omega-3 Fatty Acids (FISH OIL) 1200 MG CAPS Take by mouth daily Take 2 by mouth everyday      docusate sodium (COLACE) 100 MG capsule Take 100 mg by mouth daily [facility]      KOREAN GINSENG PO Take by mouth as needed       Multiple Vitamins-Minerals (CENTRUM CARDIO) TABS Take 1 tablet by mouth daily       DHA-EPA-VITAMIN E PO Take 4 capsules by mouth Mon- Wed- Friday      Coenzyme Q-10 200 MG CAPS Take 800 mg by mouth daily       Ascorbic Acid (VITAMIN C) 100 MG tablet Take 100 mg by mouth Twice weekly      methylcellulose 500 MG TABS 1 qd      Ergocalciferol (VITAMIN D2 PO) Take 400 Units by mouth daily       ALPRAZolam (XANAX) 0.5 MG tablet Take 0.5 mg by mouth as needed. [place] lansoprazole (PREVACID) 15 MG capsule Take 15 mg by mouth daily       QUEtiapine (SEROQUEL XR) 200 MG XR tablet Take 200 mg by mouth nightly       venlafaxine (EFFEXOR XR) 75 MG XR capsule Take 75 mg by mouth daily       vitamin E 400 UNIT capsule Take by mouth       No current facility-administered medications for this visit. Review of Systems  Constitutional: No fever, no chills, no diaphoresis, no generalized weakness.   HEENT: No blurred vision, No sore throat, no ear pain, no hair loss  Neck: denied any neck swelling, difficulty swallowing, Cadrdiopulomary: No CP, SOB or palpitation, No orthopnea or PND. No cough or wheezing. GI: No N/V/D, no constipation, No abdominal pain, no melena or hematochezia   : Denied any dysuria, hematuria, flank pain, discharge, or incontinence. Skin: denied any rash, ulcer, Hirsute, or hyperpigmentation. MSK: denied any joint deformity, joint pain/swelling, muscle pain, or back pain. Neuro: no numbess, no tingling, no weakness,     OBJECTIVE    /78   Pulse 59   Resp 18   Ht 5' 4.5\" (1.638 m)   Wt 159 lb (72.1 kg)   SpO2 96%   BMI 26.87 kg/m²   BP Readings from Last 4 Encounters:   10/12/21 127/78   10/11/21 (!) 143/75   09/22/21 112/70   09/08/21 110/64     Wt Readings from Last 6 Encounters:   10/12/21 159 lb (72.1 kg)   10/11/21 160 lb (72.6 kg)   09/22/21 161 lb 9.6 oz (73.3 kg)   09/08/21 157 lb (71.2 kg)   06/16/21 157 lb (71.2 kg)   06/15/21 156 lb (70.8 kg)       Physical examination:  General: awake alert, oriented x3, no abnormal position or movements. HEENT: normocephalic non traumatic  Neck: supple, no LN enlargement, no thyromegaly, no thyroid tenderness, no JVD. Pulm: Clear equal air entry no added sounds, no wheezing or rhonchi    CVS: S1 + S2, no murmur, no heave. Dorsalis pedis pulse palpable   Abd: soft lax, no tenderness, no organomegaly, audible bowel sounds. Skin: warm, no lesions, no rash.   Musculoskeletal: No back tenderness, no kyphosis/scoliosis   Neuro: CN intact, sensation normal , muscle power normal.  Psych: normal mood, and affect    Review of Laboratory Data:  I have reviewed the following:  Lab Results   Component Value Date/Time    WBC 5.5 09/01/2021 10:45 AM    RBC 4.57 09/01/2021 10:45 AM    HGB 14.2 09/01/2021 10:45 AM    HCT 41.9 09/01/2021 10:45 AM    MCV 91.7 09/01/2021 10:45 AM    MCH 31.1 09/01/2021 10:45 AM    MCHC 33.9 09/01/2021 10:45 AM    RDW 13.0 09/01/2021 10:45 AM     09/01/2021 10:45 AM    MPV 9.6 09/01/2021 10:45 AM      Lab Results Component Value Date/Time     09/01/2021 10:45 AM    K 4.0 09/01/2021 10:45 AM    CO2 23 09/01/2021 10:45 AM    BUN 13 09/01/2021 10:45 AM    CREATININE 0.6 09/01/2021 10:45 AM    CALCIUM 9.4 09/01/2021 10:45 AM    LABGLOM >60 09/01/2021 10:45 AM    GFRAA >60 09/01/2021 10:45 AM      Lab Results   Component Value Date/Time    TSH 0.975 09/01/2021 10:45 AM    T4FREE 1.10 09/01/2021 10:45 AM    FT3 2.4 09/01/2021 10:45 AM    FT3 2.1 10/12/2020 02:35 PM    FT3 2.3 04/23/2020 09:41 AM    FT3 3.0 05/01/2017 01:50 PM     Lab Results   Component Value Date    GLUCOSE 100 09/01/2021     Lab Results   Component Value Date    TRIG 106 09/01/2021    HDL 61 09/01/2021    LDLCALC 98 09/01/2021    CHOL 180 09/01/2021     Lab Results   Component Value Date    VITD25 40 09/01/2021    VITD25 42 10/12/2020       ASSESSMENT & [address], a 66 y.o.-old female seen in for following issues     Primary hypothyroidism   · At goal continue Levothyroxine 75 mcg 1 tab 5 days a week and none on sautruday and Sunday   · Check TFT in 6 months     Low estrogen in female   · Pt s/p oophorectomy long time ago  · Hormonal therapy per OB service   · Discussed side effects of hormonal therapy     Chronic fatigue   · Doubt adrenal insufficiency   · Advised to stop all adrenal supplement (adrenal support) and pan to perform Cosyntropin stim test in few weeks     vitD deficiency   · Continue vitD supplement     I personally reviewed external notes from PCP and other patient's care team providers, and personally interpreted labs associated with the above diagnosis. I also ordered labs to further assess and manage the above addressed medical conditions. Return in about 1 year (around 10/12/2022) for Multinodular goiter, VitD deficiency. The above issues were reviewed with the patient who understood and agreed with the plan. Thank you for allowing us to participate in the care of this patient.  Please do not hesitate to contact us with any additional questions. Diagnosis Orders   1. Low serum cortisol level (HCC)  Cortisol Total    Cortisol Total    Cortisol Total    cosyntropin (CORTROSYN) 0.25 MG injection   2. Hypothyroidism due to Hashimoto's thyroiditis  levothyroxine (SYNTHROID) 75 MCG tablet    TSH without Reflex    T4, Free   3. Multinodular goiter  US THYROID   4. Hyperglycemia  POCT glycosylated hemoglobin (Hb A1C)     Emmanuelle Betancourt MD  Endocrinologist, [facility] and Endocrinology   [address] [place] [address]   Phone: [phone]  Fax: [phone]  ------------------------------  An electronic signature was used to authenticate this note.  Tom Jhaveri MD on 10/12/2021 at 2:23 PM

## 2021-10-13 DIAGNOSIS — E27.40 ALDOSTERONE DEFICIENCY DUE TO 18-HYDROXYLASE DEFECT (HCC): ICD-10-CM

## 2021-10-13 RX ORDER — COSYNTROPIN 0.25 MG/ML
250 INJECTION, POWDER, FOR SOLUTION INTRAMUSCULAR; INTRAVENOUS ONCE
Status: CANCELLED | OUTPATIENT
Start: 2021-10-18 | End: 2021-10-18

## 2021-11-17 ENCOUNTER — HOSPITAL ENCOUNTER (OUTPATIENT)
Dept: INFUSION THERAPY | Age: 71
Setting detail: INFUSION SERIES
Discharge: HOME | End: 2021-11-17
Payer: MEDICARE

## 2021-11-17 VITALS
RESPIRATION RATE: 16 BRPM | SYSTOLIC BLOOD PRESSURE: 141 MMHG | BODY MASS INDEX: 25.99 KG/M2 | WEIGHT: 156 LBS | OXYGEN SATURATION: 100 % | HEART RATE: 50 BPM | DIASTOLIC BLOOD PRESSURE: 80 MMHG | TEMPERATURE: 98.3 F | HEIGHT: 65 IN

## 2021-11-17 DIAGNOSIS — E27.40 ALDOSTERONE DEFICIENCY DUE TO 18-HYDROXYLASE DEFECT (HCC): ICD-10-CM

## 2021-11-17 DIAGNOSIS — E03.8 HYPOTHYROIDISM DUE TO HASHIMOTO'S THYROIDITIS: Primary | ICD-10-CM

## 2021-11-17 DIAGNOSIS — E06.3 HYPOTHYROIDISM DUE TO HASHIMOTO'S THYROIDITIS: Primary | ICD-10-CM

## 2021-11-17 DIAGNOSIS — R79.89 LOW SERUM CORTISOL LEVEL: ICD-10-CM

## 2021-11-17 LAB
CORTISOL TOTAL: 10.77 MCG/DL (ref 2.68–18.4)
CORTISOL TOTAL: 22.97 MCG/DL (ref 2.68–18.4)
CORTISOL TOTAL: 25.38 MCG/DL (ref 2.68–18.4)
T4 FREE: 0.93 NG/DL (ref 0.93–1.7)
TSH SERPL DL<=0.05 MIU/L-ACNC: 1.95 UIU/ML (ref 0.27–4.2)

## 2021-11-17 PROCEDURE — 36415 COLL VENOUS BLD VENIPUNCTURE: CPT

## 2021-11-17 PROCEDURE — 84439 ASSAY OF FREE THYROXINE: CPT

## 2021-11-17 PROCEDURE — 84443 ASSAY THYROID STIM HORMONE: CPT

## 2021-11-17 PROCEDURE — 82533 TOTAL CORTISOL: CPT

## 2021-11-17 PROCEDURE — 6360000002 HC RX W HCPCS: Performed by: INTERNAL MEDICINE

## 2021-11-17 PROCEDURE — 2580000003 HC RX 258: Performed by: INTERNAL MEDICINE

## 2021-11-17 PROCEDURE — 96374 THER/PROPH/DIAG INJ IV PUSH: CPT

## 2021-11-17 RX ORDER — SODIUM CHLORIDE 0.9 % (FLUSH) 0.9 %
10 SYRINGE (ML) INJECTION PRN
Status: DISCONTINUED | OUTPATIENT
Start: 2021-11-17 | End: 2021-11-18 | Stop reason: HOSPADM

## 2021-11-17 RX ORDER — COSYNTROPIN 0.25 MG/ML
250 INJECTION, POWDER, FOR SOLUTION INTRAMUSCULAR; INTRAVENOUS ONCE
Status: CANCELLED | OUTPATIENT
Start: 2021-11-17 | End: 2021-11-17

## 2021-11-17 RX ORDER — COSYNTROPIN 0.25 MG/ML
250 INJECTION, POWDER, FOR SOLUTION INTRAMUSCULAR; INTRAVENOUS ONCE
Status: COMPLETED | OUTPATIENT
Start: 2021-11-17 | End: 2021-11-17

## 2021-11-17 RX ADMIN — SODIUM CHLORIDE, PRESERVATIVE FREE 10 ML: 5 INJECTION INTRAVENOUS at 08:15

## 2021-11-17 RX ADMIN — SODIUM CHLORIDE, PRESERVATIVE FREE 10 ML: 5 INJECTION INTRAVENOUS at 08:34

## 2021-11-17 RX ADMIN — COSYNTROPIN 250 MCG: 0.25 INJECTION, POWDER, LYOPHILIZED, FOR SOLUTION INTRAMUSCULAR; INTRAVENOUS at 08:31

## 2021-11-17 NOTE — PROGRESS NOTES
Patient tolerated IV Cosyntropin well. Instructed on IV Cosyntropin action and reportable signs/symptoms. Labs drawn baseline, 30 minutes post Cosyntropin and 60 minutes post Cosyntropin.  Reviewed AVS with patient, patient

## 2021-11-21 ENCOUNTER — TELEPHONE (OUTPATIENT)
Dept: ENDOCRINOLOGY | Age: 71
End: 2021-11-21

## 2021-11-21 NOTE — TELEPHONE ENCOUNTER
Notify pt,  I have reviewed your recent results    Excellent!  cosyntropin stim test showed excellent response and this result essentially r/o adrenal insufficiency

## 2021-12-10 ENCOUNTER — OFFICE VISIT (OUTPATIENT)
Dept: RHEUMATOLOGY | Age: 71
End: 2021-12-10
Payer: MEDICARE

## 2021-12-10 VITALS
DIASTOLIC BLOOD PRESSURE: 82 MMHG | SYSTOLIC BLOOD PRESSURE: 142 MMHG | HEIGHT: 65 IN | OXYGEN SATURATION: 99 % | HEART RATE: 77 BPM | WEIGHT: 163 LBS | BODY MASS INDEX: 27.16 KG/M2

## 2021-12-10 DIAGNOSIS — M35.01 SJOGREN'S SYNDROME WITH KERATOCONJUNCTIVITIS SICCA (HCC): Primary | ICD-10-CM

## 2021-12-10 DIAGNOSIS — R05.9 COUGH: ICD-10-CM

## 2021-12-10 DIAGNOSIS — E78.2 MIXED HYPERLIPIDEMIA: ICD-10-CM

## 2021-12-10 PROCEDURE — 99204 OFFICE O/P NEW MOD 45 MIN: CPT | Performed by: INTERNAL MEDICINE

## 2021-12-10 RX ORDER — SALIVA SUBSTITUTE COMBO NO.3
1 AEROSOL, SPRAY WITH PUMP (ML) MUCOUS MEMBRANE 4 TIMES DAILY PRN
Qty: 5.6 ML | Refills: 5 | Status: SHIPPED
Start: 2021-12-10 | End: 2022-03-02

## 2021-12-10 ASSESSMENT — ENCOUNTER SYMPTOMS
TROUBLE SWALLOWING: 0
COLOR CHANGE: 0
SHORTNESS OF BREATH: 0
DIARRHEA: 0
COUGH: 1
VOMITING: 0
NAUSEA: 0
ABDOMINAL PAIN: 0

## 2021-12-10 NOTE — PROGRESS NOTES
Torrie Araya 1950 is a 66 y.o. female, here for evaluation of the following chief complaint(s):  New Patient (sjogrens syndrome )         ASSESSMENT/PLAN:    Torrie Araya 1950 is a 66 y.o. female seen in consult for Sjogren's syndrome. 1.  Sjogren's syndrome-this is a longstanding diagnosis apparently based on positive GLENNY and sicca symptoms. She has not seen a rheumatologist in several years however. She treats dry eyes and mouth symptomatically. She has plugs in place and uses Restasis. She tries to drink plenty of water. She did not tolerate the taste of Biotene. She has never been on Evoxac or Salagen, but does not feel her dry mouth is bad enough to warrant that at this point. We will try her on Aquoral spray 4 times daily as needed. To this point she has not had extraglandular manifestations but will need further work-up as below to get a better characterization of her disease. 2.  Cough-this has been going on for a year attributed to dryness and possibly ACE inhibitor. Sjogren's can occasionally cause some lung issues we will check a chest x-ray. 3.  Hyperlipidemia-patient's with systemic connective tissue diseases have an increased cardiovascular risk. She is already on a statin    1. Sjogren's syndrome with keratoconjunctivitis sicca (HCC)  -     GLENNY; Future  -     C3 Complement; Future  -     C4 Complement; Future  -     CBC Auto Differential; Future  -     Comprehensive Metabolic Panel; Future  -     Sjogren's Ab (SS-A, SS-B); Future  -     ANTI-RNP (ENIO AB); Future  -     Scott (ENIO) Antibody IgG; Future  -     Anti-DNA Antibody, Double-Stranded; Future  -     Urinalysis; Future  -     Cyclic Citrul Peptide Antibody, IgG; Future  -     Rheumatoid Factor; Future  -     XR CHEST STANDARD (2 VW); Future  -     Electrophoresis Protein, Serum without Reflex to Immunofixation; Future  2. Cough  -     GLENNY; Future  -     C3 Complement;  Future  -     C4 Complement; Future  -     CBC Auto Differential; Future  -     Comprehensive Metabolic Panel; Future  -     Sjogren's Ab (SS-A, SS-B); Future  -     ANTI-RNP (ENIO AB); Future  -     Scott (ENIO) Antibody IgG; Future  -     Anti-DNA Antibody, Double-Stranded; Future  -     Urinalysis; Future  -     Cyclic Citrul Peptide Antibody, IgG; Future  -     Rheumatoid Factor; Future  -     XR CHEST STANDARD (2 VW); Future  -     Electrophoresis Protein, Serum without Reflex to Immunofixation; Future  3. Mixed hyperlipidemia      Return in about 6 months (around 6/10/2022). Subjective   SUBJECTIVE/OBJECTIVE:    HPI: Tapan Ga 1950 is a 66 y.o. female seen in consult for Sjogren's syndrome. This is a longstanding diagnosis based on positive GLENNY and sicca symptoms. He has not seen a rheumatologist in several years. She has actually also had a left salivary gland removed prior to this diagnosis due to recurrent infection. She has a lot of issues with dry mouth but no oral ulcers or poor dentition though she does get a dry cough. The cough has been going on for about the last year. She has dry eyes and has plugs in place and uses Restasis. She has some osteoarthritis but to this point no known extraglandular manifestations. Feels fatigued but does not sleep well and snores at night.     Past Medical History:   Diagnosis Date    Adverse effect of female hormone replacement therapy     Dr. Sherice Francisco    Bipolar disorder [facility])     Mixed, has required hospitalization in the past Dr. Constantin Cabezas Depression     GERD (gastroesophageal reflux disease)     GERD Management per Dr. Davis Sams Hyperlipidemia     Management per [facility], clinical Cardiology, Dr. Carey Gibson Hypertension     Management per [facility], clinical Cardiology    Hypothyroidism     with nodule - Managed by Dr. Stephanie Swain Sjogren's syndrome with keratoconjunctivitis sicca ([facility] [address]     Thyroid disease         Review of Systems Constitutional: Positive for fatigue. Negative for fever. HENT: Negative for mouth sores and trouble swallowing. Respiratory: Positive for cough. Negative for shortness of breath. Cardiovascular: Negative for chest pain. Gastrointestinal: Negative for abdominal pain, diarrhea, nausea and vomiting. Genitourinary: Negative for dysuria and hematuria. Musculoskeletal: Positive for arthralgias. Negative for joint swelling. Skin: Negative for color change and rash. Neurological: Negative for weakness and numbness. Hematological: Negative for adenopathy. Psychiatric/Behavioral: Positive for sleep disturbance. All other systems reviewed and are negative. Objective   Vitals:    12/10/21 1019   BP: (!) 142/82   Pulse: 77   SpO2: 99%      Physical Exam  Constitutional:       General: She is not in acute distress. Appearance: Normal appearance. HENT:      Head: Normocephalic and atraumatic. Right Ear: External ear normal.      Left Ear: External ear normal.      Nose: Nose normal.      Mouth/Throat:      Comments: Poor salivary pooling. Eyes:      General: No scleral icterus. Comments: Poor lacrimal pooling. Pulmonary:      Effort: Pulmonary effort is normal.      Breath sounds: Normal breath sounds. Musculoskeletal:         General: Deformity present. No swelling or tenderness. Comments: She has Heberden's and Belinda's nodes and squaring of the first CMC joints bilaterally but no evidence of synovitis on exam.   Skin:     General: Skin is warm and dry. Findings: No rash. Neurological:      General: No focal deficit present. Mental Status: She is alert and oriented to person, place, and time. Mental status is at baseline.    Psychiatric:         Mood and Affect: Mood normal.         Behavior: Behavior normal.            Lab Results   Component Value Date    WBC 5.5 09/01/2021    HGB 14.2 09/01/2021    HCT 41.9 09/01/2021    MCV 91.7 09/01/2021     09/01/2021     Lab Results   Component Value Date     09/01/2021    K 4.0 09/01/2021     09/01/2021    CO2 23 09/01/2021    BUN 13 09/01/2021    CREATININE 0.6 09/01/2021    GLUCOSE 100 (H) 09/01/2021    CALCIUM 9.4 09/01/2021    PROT 6.9 09/01/2021    LABALBU 4.6 09/01/2021    BILITOT 0.5 09/01/2021    ALKPHOS 53 09/01/2021    AST 37 (H) 09/01/2021    ALT 32 09/01/2021    LABGLOM >60 09/01/2021    GFRAA >60 09/01/2021     No results found for: GLENNY  No results found for: RHEUMFACTOR  No results found for: SEDRATE  No results found for: CRP         An electronic signature was used to authenticate this note. This note was generated with a voice recognition dictation system. Please disregard any errors or omission which have escaped my review.     --Allen Bender, DO

## 2021-12-17 ENCOUNTER — HOSPITAL ENCOUNTER (OUTPATIENT)
Dept: GENERAL RADIOLOGY | Age: 71
Discharge: HOME | End: 2021-12-19
Payer: MEDICARE

## 2021-12-17 ENCOUNTER — HOSPITAL ENCOUNTER (OUTPATIENT)
Age: 71
Discharge: HOME | End: 2021-12-19
Payer: MEDICARE

## 2021-12-17 ENCOUNTER — HOSPITAL ENCOUNTER (OUTPATIENT)
Age: 71
Discharge: HOME | End: 2021-12-17
Payer: MEDICARE

## 2021-12-17 DIAGNOSIS — M35.01 SJOGREN'S SYNDROME WITH KERATOCONJUNCTIVITIS SICCA (HCC): ICD-10-CM

## 2021-12-17 DIAGNOSIS — R05.9 COUGH: ICD-10-CM

## 2021-12-17 LAB
ALBUMIN SERPL-MCNC: 4.4 G/DL (ref 3.5–5.2)
ALP BLD-CCNC: 60 U/L (ref 35–104)
ALT SERPL-CCNC: 51 U/L (ref 0–32)
ANION GAP SERPL CALCULATED.3IONS-SCNC: 12 MMOL/L (ref 7–16)
AST SERPL-CCNC: 44 U/L (ref 0–31)
BASOPHILS ABSOLUTE: 0.04 E9/L (ref 0–0.2)
BASOPHILS RELATIVE PERCENT: 0.8 % (ref 0–2)
BILIRUB SERPL-MCNC: 0.4 MG/DL (ref 0–1.2)
BILIRUBIN URINE: NEGATIVE
BLOOD, URINE: NEGATIVE
BUN BLDV-MCNC: 10 MG/DL (ref 6–23)
C3 COMPLEMENT: 132 MG/DL (ref 90–180)
C4 COMPLEMENT: 24 MG/DL (ref 10–40)
CALCIUM SERPL-MCNC: 8.9 MG/DL (ref 8.6–10.2)
CHLORIDE BLD-SCNC: 103 MMOL/L (ref 98–107)
CLARITY: CLEAR
CO2: 23 MMOL/L (ref 22–29)
COLOR: YELLOW
CREAT SERPL-MCNC: 0.6 MG/DL (ref 0.5–1)
EOSINOPHILS ABSOLUTE: 0.15 E9/L (ref 0.05–0.5)
EOSINOPHILS RELATIVE PERCENT: 2.8 % (ref 0–6)
GFR AFRICAN AMERICAN: >60
GFR NON-AFRICAN AMERICAN: >60 ML/MIN/1.73
GLUCOSE BLD-MCNC: 103 MG/DL (ref 74–99)
GLUCOSE URINE: NEGATIVE MG/DL
HCT VFR BLD CALC: 40.7 % (ref 34–48)
HEMOGLOBIN: 13.6 G/DL (ref 11.5–15.5)
IMMATURE GRANULOCYTES #: 0.01 E9/L
IMMATURE GRANULOCYTES %: 0.2 % (ref 0–5)
KETONES, URINE: NEGATIVE MG/DL
LEUKOCYTE ESTERASE, URINE: NEGATIVE
LYMPHOCYTES ABSOLUTE: 2.25 E9/L (ref 1.5–4)
LYMPHOCYTES RELATIVE PERCENT: 42.5 % (ref 20–42)
MCH RBC QN AUTO: 30.6 PG (ref 26–35)
MCHC RBC AUTO-ENTMCNC: 33.4 % (ref 32–34.5)
MCV RBC AUTO: 91.7 FL (ref 80–99.9)
MONOCYTES ABSOLUTE: 0.43 E9/L (ref 0.1–0.95)
MONOCYTES RELATIVE PERCENT: 8.1 % (ref 2–12)
NEUTROPHILS ABSOLUTE: 2.42 E9/L (ref 1.8–7.3)
NEUTROPHILS RELATIVE PERCENT: 45.6 % (ref 43–80)
NITRITE, URINE: NEGATIVE
PDW BLD-RTO: 13 FL (ref 11.5–15)
PH UA: 6 (ref 5–9)
PLATELET # BLD: 236 E9/L (ref 130–450)
PMV BLD AUTO: 9.8 FL (ref 7–12)
POTASSIUM SERPL-SCNC: 3.7 MMOL/L (ref 3.5–5)
PROTEIN UA: NEGATIVE MG/DL
RBC # BLD: 4.44 E12/L (ref 3.5–5.5)
RHEUMATOID FACTOR: <10 IU/ML (ref 0–13)
SODIUM BLD-SCNC: 138 MMOL/L (ref 132–146)
SPECIFIC GRAVITY UA: 1.01 (ref 1–1.03)
UROBILINOGEN, URINE: 0.2 E.U./DL
WBC # BLD: 5.3 E9/L (ref 4.5–11.5)

## 2021-12-17 PROCEDURE — 86235 NUCLEAR ANTIGEN ANTIBODY: CPT

## 2021-12-17 PROCEDURE — 81003 URINALYSIS AUTO W/O SCOPE: CPT

## 2021-12-17 PROCEDURE — 86431 RHEUMATOID FACTOR QUANT: CPT

## 2021-12-17 PROCEDURE — 71046 X-RAY EXAM CHEST 2 VIEWS: CPT

## 2021-12-17 PROCEDURE — 86225 DNA ANTIBODY NATIVE: CPT

## 2021-12-17 PROCEDURE — 84165 PROTEIN E-PHORESIS SERUM: CPT

## 2021-12-17 PROCEDURE — 85025 COMPLETE CBC W/AUTO DIFF WBC: CPT

## 2021-12-17 PROCEDURE — 80053 COMPREHEN METABOLIC PANEL: CPT

## 2021-12-17 PROCEDURE — 36415 COLL VENOUS BLD VENIPUNCTURE: CPT

## 2021-12-17 PROCEDURE — 86160 COMPLEMENT ANTIGEN: CPT

## 2021-12-17 PROCEDURE — 86200 CCP ANTIBODY: CPT

## 2021-12-17 PROCEDURE — 86038 ANTINUCLEAR ANTIBODIES: CPT

## 2021-12-20 ENCOUNTER — OFFICE VISIT (OUTPATIENT)
Dept: FAMILY MEDICINE CLINIC | Age: 71
End: 2021-12-20
Payer: MEDICARE

## 2021-12-20 VITALS
SYSTOLIC BLOOD PRESSURE: 122 MMHG | HEIGHT: 64 IN | RESPIRATION RATE: 16 BRPM | BODY MASS INDEX: 28.51 KG/M2 | OXYGEN SATURATION: 98 % | HEART RATE: 73 BPM | DIASTOLIC BLOOD PRESSURE: 74 MMHG | TEMPERATURE: 97.8 F | WEIGHT: 167 LBS

## 2021-12-20 DIAGNOSIS — E03.9 ACQUIRED HYPOTHYROIDISM: ICD-10-CM

## 2021-12-20 DIAGNOSIS — E78.2 MIXED HYPERLIPIDEMIA: ICD-10-CM

## 2021-12-20 DIAGNOSIS — I10 ESSENTIAL HYPERTENSION: Primary | ICD-10-CM

## 2021-12-20 LAB
ALBUMIN SERPL-MCNC: 3.7 G/DL (ref 3.5–4.7)
ALPHA-1-GLOBULIN: 0.2 G/DL (ref 0.2–0.4)
ALPHA-2-GLOBULIN: 0.6 G/DL (ref 0.5–1)
ANTI DNA DOUBLE STRANDED: NEGATIVE
ANTI-NUCLEAR ANTIBODY (ANA): NEGATIVE
BETA GLOBULIN: 0.9 G/DL (ref 0.8–1.3)
ELECTROPHORESIS: ABNORMAL
ENA TO RNP ANTIBODY: NEGATIVE
ENA TO SMITH (SM) ANTIBODY: NEGATIVE
ENA TO SSA (RO) ANTIBODY: NEGATIVE
ENA TO SSB (LA) ANTIBODY: NEGATIVE
GAMMA GLOBULIN: 0.9 G/DL (ref 0.7–1.6)
TOTAL PROTEIN: 6.2 G/DL (ref 6.4–8.3)

## 2021-12-20 PROCEDURE — 99214 OFFICE O/P EST MOD 30 MIN: CPT | Performed by: FAMILY MEDICINE

## 2021-12-20 RX ORDER — ATENOLOL 50 MG/1
50 TABLET ORAL DAILY
Qty: 90 TABLET | Refills: 0 | Status: SHIPPED | OUTPATIENT
Start: 2021-12-20

## 2021-12-20 RX ORDER — AMLODIPINE BESYLATE 5 MG/1
5 TABLET ORAL DAILY
Qty: 30 TABLET | Refills: 0 | Status: SHIPPED
Start: 2021-12-20 | End: 2022-02-08

## 2021-12-20 RX ORDER — EZETIMIBE 10 MG/1
10 TABLET ORAL DAILY
Qty: 90 TABLET | Refills: 0 | Status: SHIPPED | OUTPATIENT
Start: 2021-12-20

## 2021-12-20 RX ORDER — ATORVASTATIN CALCIUM 80 MG/1
80 TABLET, FILM COATED ORAL DAILY
Qty: 90 TABLET | Refills: 0 | Status: SHIPPED
Start: 2021-12-20 | End: 2022-03-09

## 2021-12-20 NOTE — PROGRESS NOTES
12/20/2021    Chief Complaint   Patient presents with    Hypertension     blood pressure  check        Hypertension: Patient is here for follow up chronic hypertension. Patient is  compliant with lifestyle modifications like exercise and adherence to a low salt diet. Patient is  well controlled. Patient denies chest pain, diaphoresis, dyspnea, dyspnea on exertion, peripheral edema, palpitations, HA, visual issues. Med list reviewed. Taking as prescribed. No adverse effects. Has a dry cough with enalapril. Is on atenolol 50 mg. Enalapril caused a cough. Valsartan caused stomach issues. Does have Sjogrens, is always dry. Hyperlipidemia:  Patient presents with hyperlipidemia. Is asymptomatic. This is a chronic problem. Patient is  well controlled, as reviewed and seen on most recent labs. Compliance with treatment thus far has been adequate. No adverse effects. Hypothyroidism:  Patient is here today to follow up chronic hypothyroidism. This problem is longstanding. This is   generally controlled on current medication regimen. Takes medication as directed and tolerates well. Denies fatigue, changes in skin, hair or nails, unintentional weight loss or gain. remarkable. TSH:    Lab Results   Component Value Date    TSH 1.950 11/17/2021      Patient's past medical, surgical, social and/or family history reviewed, updated in chart, and are non-contributory (unless otherwise stated). Medications and allergies also reviewed and updated in chart.     ROS negative unless otherwise specified    Physical Exam  Wt Readings from Last 3 Encounters:   12/20/21 167 lb (75.8 kg)   12/10/21 163 lb (73.9 kg)   11/17/21 156 lb (70.8 kg)     Temp Readings from Last 3 Encounters:   12/20/21 97.8 °F (36.6 °C)   11/17/21 98.3 °F (36.8 °C) (Oral)   10/11/21 97.2 °F (36.2 °C) (Temporal)     BP Readings from Last 3 Encounters:   12/20/21 122/74   12/10/21 (!) 142/82   11/17/21 (!) 141/80     Pulse Readings from Last 3 Encounters: 12/20/21 73   12/10/21 77   11/17/21 50       General appearance: alert, well appearing, and in no distress, oriented to person, place, and time and normal appearing weight. CVS exam: normal rate, regular rhythm, normal S1, S2, no murmurs, rubs, clicks or gallops. Radial pulses 2+ bilateral.  PT/DP pulse 2+ bilat. No C/C/E    Chest: clear to auscultation, no wheezes, rales or rhonchi, symmetric air entry. Abdomen: Soft, non-tender, non-distended, positive BS in all 4 quadrants    Extremities:Dorsalis pedis pulses palpated bilaterally, no clubbing, cyanosis, edema or erythema, Sensory exam of the foot is normal, tested with the monofilament. Good pulses, no lesions or ulcers, good peripheral pulses. SKIN: warm, dry, no lesions, jaundice, petechiae, pallor, cyanosis, ecchymosis    NEURO: gross motor exam normal by observation, gait normal    Mental status - alert, oriented to person, place, and time, normal mood, behavior, speech, dress, motor activity, and thought processes      Assessment/Plan  Yeimi Jordant was seen today for hypertension. Diagnoses and all orders for this visit:    Essential hypertension  -     START:amLODIPine (NORVASC) 5 MG tablet; Take 1 tablet by mouth daily  -     atenolol (TENORMIN) 50 MG tablet; Take 1 tablet by mouth daily  -     ezetimibe (ZETIA) 10 MG tablet; Take 1 tablet by mouth daily    Mixed hyperlipidemia  -     atorvastatin (LIPITOR) 80 MG tablet; Take 1 tablet by mouth daily    Acquired hypothyroidism  -  Stable, continue medications as prescribed. Return in about 6 months (around 6/20/2022), or 1 week nurse visit. Call or go to ED immediately if symptoms worsen or persist.    Educational materials and/or home exercises printed for patient's review and were included in patient instructions on his/her After Visit Summary and given to patient at the end of visit.        Counseled regarding above diagnosis, including possible risks and complications,

## 2021-12-20 NOTE — PATIENT INSTRUCTIONS
For Proper Measurement of Blood Pressure:    · sit in a chair with your back supported   · feet flat on the floor  · arm should be without clothing and supported at heart level  · caffeine and tobacco should be avoided in the 30 minutes before measurement   · measurement should begin after 5 minutes of rest  · proper cuff size should be used which is defined as a cuff bladder that encircles 80% of your arm

## 2021-12-21 DIAGNOSIS — M35.01 SJOGREN'S SYNDROME WITH KERATOCONJUNCTIVITIS SICCA (HCC): Primary | ICD-10-CM

## 2021-12-22 LAB — CYCLIC CITRULLINATED PEPTIDE ANTIBODY IGG: 1.5 U/ML (ref 0–7)

## 2022-01-11 ENCOUNTER — OFFICE VISIT (OUTPATIENT)
Dept: FAMILY MEDICINE CLINIC | Age: 72
End: 2022-01-11
Payer: MEDICARE

## 2022-01-11 VITALS
OXYGEN SATURATION: 97 % | RESPIRATION RATE: 16 BRPM | WEIGHT: 168 LBS | DIASTOLIC BLOOD PRESSURE: 74 MMHG | SYSTOLIC BLOOD PRESSURE: 138 MMHG | TEMPERATURE: 97.1 F | HEART RATE: 66 BPM | BODY MASS INDEX: 28.68 KG/M2 | HEIGHT: 64 IN

## 2022-01-11 DIAGNOSIS — I10 ESSENTIAL HYPERTENSION: Primary | ICD-10-CM

## 2022-01-11 PROCEDURE — 93000 ELECTROCARDIOGRAM COMPLETE: CPT | Performed by: FAMILY MEDICINE

## 2022-01-11 PROCEDURE — 99214 OFFICE O/P EST MOD 30 MIN: CPT | Performed by: FAMILY MEDICINE

## 2022-01-11 NOTE — PROGRESS NOTES
1/23/2022    Chief Complaint   Patient presents with    Hypertension     [facility] bloodpressure monitor here        Renetta Morillo is a 66 y.o. patient that presents today for:    Hypertension: Here for follow up chronic hypertension. Patient is  compliant with lifestyle modifications like exercise and adherence to a low salt diet. Patient is not well controlled. Denies chest pain, diaphoresis, dyspnea, dyspnea on exertion, peripheral edema, palpitations, HA, visual issues. Med list reviewed. Taking as prescribed. No adverse effects. BP has been running high at home. Feeling well otherwise, no complaints. Patient's past medical, surgical, social and/or family history reviewed, updated in chart, and are non-contributory (unless otherwise stated). Medications and allergies also reviewed and updated in chart. ROS negative unless otherwise specified    Physical Exam  Wt Readings from Last 3 Encounters:   01/11/22 168 lb (76.2 kg)   12/22/21 165 lb (74.8 kg)   12/20/21 167 lb (75.8 kg)     Temp Readings from Last 3 Encounters:   01/11/22 97.1 °F (36.2 °C)   12/20/21 97.8 °F (36.6 °C)   11/17/21 98.3 °F (36.8 °C) (Oral)     BP Readings from Last 3 Encounters:   01/11/22 138/74   12/22/21 120/78   12/20/21 122/74     Pulse Readings from Last 3 Encounters:   01/11/22 66   12/20/21 73   12/10/21 77       General appearance: alert, well appearing, and in no distress, oriented to person, place, and time and normal appearing weight. CVS exam: normal rate, regular rhythm, normal S1, S2, no murmurs, rubs, clicks or gallops. Radial pulses 2+ bilateral.  PT/DP pulse 2+ bilat. No C/C/E    Chest: clear to auscultation, no wheezes, rales or rhonchi, symmetric air entry.      Abdomen: Soft, non-tender, non-distended, positive BS in all 4 quadrants    Extremities:Dorsalis pedis pulses palpated bilaterally, no clubbing, cyanosis, edema or erythema     SKIN: warm, dry, no lesions, jaundice, petechiae, pallor, cyanosis, ecchymosis    NEURO: gross motor exam normal by observation, gait normal    Mental status - alert, oriented to person, place, and time, normal mood, behavior, speech, dress, motor activity, and thought processes      Assessment/Plan  Gissel Cabral was seen today for hypertension. Diagnoses and all orders for this visit:    Essential hypertension  -     EKG 12 Lead        No follow-ups on file. Call or go to ED immediately if symptoms worsen or persist.    Educational materials and/or home exercises printed for patient's review and were included in patient instructions on his/her After Visit Summary and given to patient at the end of visit. Counseled regarding above diagnosis, including possible risks and complications,  especially if left uncontrolled. Counseled regarding the possible side effects, risks, benefits and alternatives to treatment; patient and/or guardian verbalizes understanding, agrees, feels comfortable with and wishes to proceed with above treatment plan. Advised patient to call with any new medication issues, and read all Rx info from pharmacy to assure aware of all possible risks and side effects of medication before taking. Reviewed age and gender appropriate health screening exams and vaccinations. Advised patient regarding importance of keeping up with recommended health maintenance and to schedule as soon as possible if overdue, as this is important in assessing for undiagnosed pathology, especially cancer, as well as protecting against potentially harmful/life threatening disease. Patient and/or guardian verbalizes understanding and agrees with above counseling, assessment and plan. All questions answered.       Lola Lemon, DO

## 2022-01-26 RX ORDER — AMLODIPINE BESYLATE 5 MG/1
5 TABLET ORAL 2 TIMES DAILY
Qty: 60 TABLET | Refills: 5 | Status: SHIPPED
Start: 2022-01-26 | End: 2022-03-02

## 2022-01-31 ENCOUNTER — HOSPITAL ENCOUNTER (OUTPATIENT)
Dept: GENERAL RADIOLOGY | Age: 72
Discharge: HOME | End: 2022-02-02
Payer: MEDICARE

## 2022-01-31 DIAGNOSIS — Z12.31 ENCOUNTER FOR SCREENING MAMMOGRAM FOR MALIGNANT NEOPLASM OF BREAST: ICD-10-CM

## 2022-01-31 PROCEDURE — 77063 BREAST TOMOSYNTHESIS BI: CPT

## 2022-02-08 ENCOUNTER — OFFICE VISIT (OUTPATIENT)
Dept: FAMILY MEDICINE CLINIC | Age: 72
End: 2022-02-08
Payer: MEDICARE

## 2022-02-08 VITALS
HEART RATE: 81 BPM | RESPIRATION RATE: 16 BRPM | OXYGEN SATURATION: 97 % | TEMPERATURE: 97.5 F | SYSTOLIC BLOOD PRESSURE: 132 MMHG | DIASTOLIC BLOOD PRESSURE: 76 MMHG | BODY MASS INDEX: 28.17 KG/M2 | WEIGHT: 165 LBS | HEIGHT: 64 IN

## 2022-02-08 DIAGNOSIS — I10 ESSENTIAL HYPERTENSION: Primary | ICD-10-CM

## 2022-02-08 DIAGNOSIS — L30.4 INTERTRIGO: ICD-10-CM

## 2022-02-08 PROCEDURE — 99214 OFFICE O/P EST MOD 30 MIN: CPT | Performed by: FAMILY MEDICINE

## 2022-02-08 RX ORDER — HYDROCHLOROTHIAZIDE 12.5 MG/1
12.5 TABLET ORAL DAILY
Qty: 30 TABLET | Refills: 0 | Status: SHIPPED
Start: 2022-02-08 | End: 2022-09-20

## 2022-02-08 RX ORDER — NYSTATIN 100000 [USP'U]/G
POWDER TOPICAL
Qty: 60 G | Refills: 1 | Status: SHIPPED
Start: 2022-02-08 | End: 2022-08-01 | Stop reason: SDUPTHER

## 2022-02-08 RX ORDER — ENALAPRIL MALEATE 20 MG/1
TABLET ORAL
COMMUNITY
Start: 2021-12-20 | End: 2022-02-08

## 2022-02-08 RX ORDER — NYSTATIN 100000 U/G
CREAM TOPICAL
Qty: 30 G | Refills: 2 | Status: SHIPPED
Start: 2022-02-08 | End: 2022-05-09 | Stop reason: SDUPTHER

## 2022-02-08 NOTE — PROGRESS NOTES
2/8/2022    Chief Complaint   Patient presents with    Foot Swelling     during the day the feet are good at n ight they swell        HPI    Akiko Bentley is a 66 y.o. patient that presents today for:    Edema: Patient complains of edema. The location of the edema is lower leg(s) bilateral.  The edema has been moderate. The swelling has been aggravated by dependency of involved area, relieved by nothing, and been associated with amlodipine. Cardiac risk factors include hypertension and obesity (BMI >= 30 kg/m2). Is only taking atenolol and amoldipine. Edema is worse with amlodipine. Enalapril caused a cough. Patient's past medical, surgical, social and/or family history reviewed, updated in chart, and are non-contributory (unless otherwise stated). Medications and allergies also reviewed and updated in chart. ROS negative unless otherwise specified    Physical Exam  Temp Readings from Last 3 Encounters:   02/08/22 97.5 °F (36.4 °C)   01/11/22 97.1 °F (36.2 °C)   12/20/21 97.8 °F (36.6 °C)     Wt Readings from Last 3 Encounters:   02/08/22 165 lb (74.8 kg)   01/11/22 168 lb (76.2 kg)   12/22/21 165 lb (74.8 kg)     BP Readings from Last 3 Encounters:   02/08/22 132/76   01/11/22 138/74   12/22/21 120/78     Pulse Readings from Last 3 Encounters:   02/08/22 81   01/11/22 66   12/20/21 73       General appearance: alert, well appearing, and in no distress, oriented to person, place, and time and normal appearing weight. CVS exam: normal rate, regular rhythm, normal S1, S2, no murmurs, rubs, clicks or gallops. Radial pulses 2+ bilateral.  PT/DP pulse 2+ bilat. No C/C/E    Chest: clear to auscultation, no wheezes, rales or rhonchi, symmetric air entry.      Abdomen: Soft, non-tender, non-distended, positive BS in all 4 quadrants    Extremities:Dorsalis pedis pulses palpated bilaterally, no clubbing, cyanosis, edema or erythema,     SKIN: no lesions, jaundice, petechiae, pallor, cyanosis, ecchymosis    NEURO: gross motor exam normal by observation, gait normal    Mental status - alert, oriented to person, place, and time, normal mood, behavior, speech, dress, motor activity, and thought processes      Assessment/Plan  Tavon Barrera was seen today for foot swelling. Diagnoses and all orders for this visit:    Essential hypertension  -     START:hydroCHLOROthiazide (HYDRODIURIL) 12.5 MG tablet; Take 1 tablet by mouth daily  -     BASIC METABOLIC PANEL; Future- in 1 week    Intertrigo  -     nystatin (MYCOSTATIN) 091742 UNIT/GM cream; Apply topically 2 times daily. -     nystatin (MYCOSTATIN) 378719 UNIT/GM powder; Apply 3 times daily. Return in about 1 week (around 2/15/2022) for BP check, Nurse Visit, BMP. Lola Lemon, DO    Call or go to ED immediately if symptoms worsen or persist.    Educational materials and/or home exercises printed for patient's review and were included in patient instructions on his/her After Visit Summary and given to patient at the end of visit. Counseled regarding above diagnosis, including possible risks and complications,  especially if left uncontrolled. Counseled regarding the possible side effects, risks, benefits and alternatives to treatment; patient and/or guardian verbalizes understanding, agrees, feels comfortable with and wishes to proceed with above treatment plan. Advised patient to call with any new medication issues, and read all Rx info from pharmacy to assure aware of all possible risks and side effects of medication before taking. Reviewed age and gender appropriate health screening exams and vaccinations. Advised patient regarding importance of keeping up with recommended health maintenance and to schedule as soon as possible if overdue, as this is important in assessing for undiagnosed pathology, especially cancer, as well as protecting against potentially harmful/life threatening disease.       Patient and/or guardian verbalizes understanding and agrees with above counseling, assessment and plan. All questions answered.

## 2022-02-09 DIAGNOSIS — I10 ESSENTIAL HYPERTENSION: Primary | ICD-10-CM

## 2022-02-24 ENCOUNTER — HOSPITAL ENCOUNTER (OUTPATIENT)
Age: 72
Discharge: HOME | End: 2022-02-24
Payer: MEDICARE

## 2022-02-24 DIAGNOSIS — E34.9 HORMONE IMBALANCE: ICD-10-CM

## 2022-02-24 DIAGNOSIS — E03.8 HYPOTHYROIDISM DUE TO HASHIMOTO'S THYROIDITIS: ICD-10-CM

## 2022-02-24 DIAGNOSIS — E06.3 HYPOTHYROIDISM DUE TO HASHIMOTO'S THYROIDITIS: ICD-10-CM

## 2022-02-24 DIAGNOSIS — M35.01 SJOGREN'S SYNDROME WITH KERATOCONJUNCTIVITIS SICCA (HCC): ICD-10-CM

## 2022-02-24 DIAGNOSIS — Z79.890 HORMONE REPLACEMENT THERAPY: ICD-10-CM

## 2022-02-24 DIAGNOSIS — R74.01 TRANSAMINITIS: Primary | ICD-10-CM

## 2022-02-24 DIAGNOSIS — E55.9 VITAMIN D DEFICIENCY, UNSPECIFIED: ICD-10-CM

## 2022-02-24 LAB
ALBUMIN SERPL-MCNC: 4.8 G/DL (ref 3.5–5.2)
ALP BLD-CCNC: 61 U/L (ref 35–104)
ALT SERPL-CCNC: 74 U/L (ref 0–32)
ANION GAP SERPL CALCULATED.3IONS-SCNC: 11 MMOL/L (ref 7–16)
AST SERPL-CCNC: 56 U/L (ref 0–31)
BILIRUB SERPL-MCNC: 0.7 MG/DL (ref 0–1.2)
BUN BLDV-MCNC: 14 MG/DL (ref 6–23)
CALCIUM SERPL-MCNC: 9.6 MG/DL (ref 8.6–10.2)
CHLORIDE BLD-SCNC: 102 MMOL/L (ref 98–107)
CHOLESTEROL, TOTAL: 159 MG/DL (ref 0–199)
CO2: 24 MMOL/L (ref 22–29)
CREAT SERPL-MCNC: 0.6 MG/DL (ref 0.5–1)
ESTRADIOL LEVEL: 90 PG/ML
GFR AFRICAN AMERICAN: >60
GFR NON-AFRICAN AMERICAN: >60 ML/MIN/1.73
GLUCOSE BLD-MCNC: 122 MG/DL (ref 74–99)
HDLC SERPL-MCNC: 61 MG/DL
LDL CHOLESTEROL CALCULATED: 68 MG/DL (ref 0–99)
POTASSIUM SERPL-SCNC: 4 MMOL/L (ref 3.5–5)
SODIUM BLD-SCNC: 137 MMOL/L (ref 132–146)
T3 FREE: 2.5 PG/ML (ref 2–4.4)
T4 FREE: 1.14 NG/DL (ref 0.93–1.7)
TOTAL PROTEIN: 6.8 G/DL (ref 6.4–8.3)
TRIGL SERPL-MCNC: 149 MG/DL (ref 0–149)
TSH SERPL DL<=0.05 MIU/L-ACNC: 2.74 UIU/ML (ref 0.27–4.2)
VITAMIN D 25-HYDROXY: 40 NG/ML (ref 30–100)
VLDLC SERPL CALC-MCNC: 30 MG/DL

## 2022-02-24 PROCEDURE — 80053 COMPREHEN METABOLIC PANEL: CPT

## 2022-02-24 PROCEDURE — 82670 ASSAY OF TOTAL ESTRADIOL: CPT

## 2022-02-24 PROCEDURE — 84443 ASSAY THYROID STIM HORMONE: CPT

## 2022-02-24 PROCEDURE — 84144 ASSAY OF PROGESTERONE: CPT

## 2022-02-24 PROCEDURE — 84140 ASSAY OF PREGNENOLONE: CPT

## 2022-02-24 PROCEDURE — 82306 VITAMIN D 25 HYDROXY: CPT

## 2022-02-24 PROCEDURE — 80061 LIPID PANEL: CPT

## 2022-02-24 PROCEDURE — 84439 ASSAY OF FREE THYROXINE: CPT

## 2022-02-24 PROCEDURE — 84481 FREE ASSAY (FT-3): CPT

## 2022-02-24 PROCEDURE — 82627 DEHYDROEPIANDROSTERONE: CPT

## 2022-02-24 PROCEDURE — 36415 COLL VENOUS BLD VENIPUNCTURE: CPT

## 2022-02-25 LAB — PROGESTERONE LEVEL: 4.32 NG/ML

## 2022-03-02 LAB — PREGNENOLONE: 29 NG/DL (ref 15–132)

## 2022-03-04 LAB — DHEAS (DHEA SULFATE): 3 UG/DL (ref 9–246)

## 2022-03-07 ENCOUNTER — TELEPHONE (OUTPATIENT)
Dept: FAMILY MEDICINE CLINIC | Age: 72
End: 2022-03-07

## 2022-03-09 DIAGNOSIS — E78.2 MIXED HYPERLIPIDEMIA: ICD-10-CM

## 2022-03-09 RX ORDER — ATORVASTATIN CALCIUM 80 MG/1
TABLET, FILM COATED ORAL
Qty: 90 TABLET | Refills: 0 | Status: SHIPPED | OUTPATIENT
Start: 2022-03-09

## 2022-03-28 ENCOUNTER — HOSPITAL ENCOUNTER (OUTPATIENT)
Age: 72
Discharge: HOME | End: 2022-03-28
Payer: MEDICARE

## 2022-03-28 DIAGNOSIS — E78.2 MIXED HYPERLIPIDEMIA: ICD-10-CM

## 2022-03-28 DIAGNOSIS — E03.9 ACQUIRED HYPOTHYROIDISM: ICD-10-CM

## 2022-03-28 DIAGNOSIS — R73.9 ELEVATED BLOOD SUGAR: ICD-10-CM

## 2022-03-28 DIAGNOSIS — I10 ESSENTIAL HYPERTENSION: ICD-10-CM

## 2022-03-28 LAB
ALBUMIN SERPL-MCNC: 4.5 G/DL (ref 3.5–5.2)
ALP BLD-CCNC: 60 U/L (ref 35–104)
ALT SERPL-CCNC: 49 U/L (ref 0–32)
ANION GAP SERPL CALCULATED.3IONS-SCNC: 9 MMOL/L (ref 7–16)
AST SERPL-CCNC: 38 U/L (ref 0–31)
BILIRUB SERPL-MCNC: 0.6 MG/DL (ref 0–1.2)
BUN BLDV-MCNC: 12 MG/DL (ref 6–23)
CALCIUM SERPL-MCNC: 9.5 MG/DL (ref 8.6–10.2)
CHLORIDE BLD-SCNC: 101 MMOL/L (ref 98–107)
CHOLESTEROL, TOTAL: 155 MG/DL (ref 0–199)
CO2: 24 MMOL/L (ref 22–29)
CREAT SERPL-MCNC: 0.6 MG/DL (ref 0.5–1)
FERRITIN: 174 NG/ML
GFR AFRICAN AMERICAN: >60
GFR NON-AFRICAN AMERICAN: >60 ML/MIN/1.73
GLUCOSE BLD-MCNC: 110 MG/DL (ref 74–99)
HAV IGM SER IA-ACNC: NORMAL
HBA1C MFR BLD: 5.7 % (ref 4–5.6)
HCT VFR BLD CALC: 42.4 % (ref 34–48)
HDLC SERPL-MCNC: 60 MG/DL
HEMOGLOBIN: 13.9 G/DL (ref 11.5–15.5)
HEPATITIS C ANTIBODY INTERPRETATION: NORMAL
INR BLD: 0.9
IRON SATURATION: 27 % (ref 15–50)
IRON: 82 MCG/DL (ref 37–145)
LDL CHOLESTEROL CALCULATED: 75 MG/DL (ref 0–99)
MCH RBC QN AUTO: 30 PG (ref 26–35)
MCHC RBC AUTO-ENTMCNC: 32.8 % (ref 32–34.5)
MCV RBC AUTO: 91.6 FL (ref 80–99.9)
PDW BLD-RTO: 12.7 FL (ref 11.5–15)
PLATELET # BLD: 235 E9/L (ref 130–450)
PMV BLD AUTO: 9.4 FL (ref 7–12)
POTASSIUM SERPL-SCNC: 3.9 MMOL/L (ref 3.5–5)
PROTHROMBIN TIME: 10.3 SEC (ref 9.3–12.4)
RBC # BLD: 4.63 E12/L (ref 3.5–5.5)
SODIUM BLD-SCNC: 134 MMOL/L (ref 132–146)
T4 FREE: 1.05 NG/DL (ref 0.93–1.7)
TOTAL IRON BINDING CAPACITY: 304 MCG/DL (ref 250–450)
TOTAL PROTEIN: 6.7 G/DL (ref 6.4–8.3)
TRIGL SERPL-MCNC: 101 MG/DL (ref 0–149)
TSH SERPL DL<=0.05 MIU/L-ACNC: 2.33 UIU/ML (ref 0.27–4.2)
TSH SERPL DL<=0.05 MIU/L-ACNC: 2.35 UIU/ML (ref 0.27–4.2)
VLDLC SERPL CALC-MCNC: 20 MG/DL
WBC # BLD: 5.7 E9/L (ref 4.5–11.5)

## 2022-03-28 PROCEDURE — 83550 IRON BINDING TEST: CPT

## 2022-03-28 PROCEDURE — 83883 ASSAY NEPHELOMETRY NOT SPEC: CPT

## 2022-03-28 PROCEDURE — 83540 ASSAY OF IRON: CPT

## 2022-03-28 PROCEDURE — 82977 ASSAY OF GGT: CPT

## 2022-03-28 PROCEDURE — 86255 FLUORESCENT ANTIBODY SCREEN: CPT

## 2022-03-28 PROCEDURE — 82103 ALPHA-1-ANTITRYPSIN TOTAL: CPT

## 2022-03-28 PROCEDURE — 84443 ASSAY THYROID STIM HORMONE: CPT

## 2022-03-28 PROCEDURE — 86709 HEPATITIS A IGM ANTIBODY: CPT

## 2022-03-28 PROCEDURE — 85027 COMPLETE CBC AUTOMATED: CPT

## 2022-03-28 PROCEDURE — 86038 ANTINUCLEAR ANTIBODIES: CPT

## 2022-03-28 PROCEDURE — 80061 LIPID PANEL: CPT

## 2022-03-28 PROCEDURE — 84450 TRANSFERASE (AST) (SGOT): CPT

## 2022-03-28 PROCEDURE — 84478 ASSAY OF TRIGLYCERIDES: CPT

## 2022-03-28 PROCEDURE — 84439 ASSAY OF FREE THYROXINE: CPT

## 2022-03-28 PROCEDURE — 80053 COMPREHEN METABOLIC PANEL: CPT

## 2022-03-28 PROCEDURE — 85610 PROTHROMBIN TIME: CPT

## 2022-03-28 PROCEDURE — 82172 ASSAY OF APOLIPOPROTEIN: CPT

## 2022-03-28 PROCEDURE — 83036 HEMOGLOBIN GLYCOSYLATED A1C: CPT

## 2022-03-28 PROCEDURE — 36415 COLL VENOUS BLD VENIPUNCTURE: CPT

## 2022-03-28 PROCEDURE — 82947 ASSAY GLUCOSE BLOOD QUANT: CPT

## 2022-03-28 PROCEDURE — 84460 ALANINE AMINO (ALT) (SGPT): CPT

## 2022-03-28 PROCEDURE — 86803 HEPATITIS C AB TEST: CPT

## 2022-03-28 PROCEDURE — 83010 ASSAY OF HAPTOGLOBIN QUANT: CPT

## 2022-03-28 PROCEDURE — 82247 BILIRUBIN TOTAL: CPT

## 2022-03-28 PROCEDURE — 82787 IGG 1 2 3 OR 4 EACH: CPT

## 2022-03-28 PROCEDURE — 82784 ASSAY IGA/IGD/IGG/IGM EACH: CPT

## 2022-03-28 PROCEDURE — 82728 ASSAY OF FERRITIN: CPT

## 2022-03-28 PROCEDURE — 82465 ASSAY BLD/SERUM CHOLESTEROL: CPT

## 2022-03-29 LAB
ANTI-MITOCHONDRIAL AB, IFA: NEGATIVE
ANTI-NUCLEAR ANTIBODY (ANA): NEGATIVE
IGA: 79 MG/DL (ref 70–400)
IGG: 618 MG/DL (ref 700–1600)
IGM: 35 MG/DL (ref 40–230)
SMOOTH MUSCLE ANTIBODY: NEGATIVE

## 2022-03-31 LAB
ALPHA-1 ANTITRYPSIN: 127 MG/DL (ref 90–200)
Lab: NORMAL
REPORT: NORMAL
THIS TEST SENT TO: NORMAL

## 2022-04-01 LAB
Lab: NORMAL
REPORT: NORMAL
THIS TEST SENT TO: NORMAL

## 2022-05-09 DIAGNOSIS — L30.4 INTERTRIGO: ICD-10-CM

## 2022-05-09 RX ORDER — NYSTATIN 100000 U/G
CREAM TOPICAL
Qty: 30 G | Refills: 2 | Status: SHIPPED
Start: 2022-05-09 | End: 2022-08-01 | Stop reason: SDUPTHER

## 2022-07-22 ENCOUNTER — TELEPHONE (OUTPATIENT)
Dept: FAMILY MEDICINE CLINIC | Age: 72
End: 2022-07-22

## 2022-07-22 DIAGNOSIS — E03.9 ACQUIRED HYPOTHYROIDISM: ICD-10-CM

## 2022-07-22 DIAGNOSIS — R73.9 ELEVATED BLOOD SUGAR: ICD-10-CM

## 2022-07-22 DIAGNOSIS — E78.2 MIXED HYPERLIPIDEMIA: ICD-10-CM

## 2022-07-22 DIAGNOSIS — I10 ESSENTIAL HYPERTENSION: Primary | ICD-10-CM

## 2022-07-22 NOTE — TELEPHONE ENCOUNTER
----- Message from Liza Melton sent at 7/22/2022  3:25 PM EDT -----  Subject: Referral Request    Reason for referral request? PT called in regards to wanting to have   orders for a1c, complete metabolic panel to have done before 8/1  Provider patient wants to be referred to(if known):     Provider Phone Number(if known):     Additional Information for Provider?   ---------------------------------------------------------------------------  --------------  [address] [employer]    [phone]; OK to leave message on voicemail  ---------------------------------------------------------------------------  --------------

## 2022-07-26 ENCOUNTER — HOSPITAL ENCOUNTER (OUTPATIENT)
Age: 72
Discharge: HOME | End: 2022-07-26
Payer: MEDICARE

## 2022-07-26 DIAGNOSIS — E78.2 MIXED HYPERLIPIDEMIA: ICD-10-CM

## 2022-07-26 DIAGNOSIS — R73.9 ELEVATED BLOOD SUGAR: ICD-10-CM

## 2022-07-26 DIAGNOSIS — E03.9 ACQUIRED HYPOTHYROIDISM: ICD-10-CM

## 2022-07-26 DIAGNOSIS — I10 ESSENTIAL HYPERTENSION: ICD-10-CM

## 2022-07-26 LAB
ANION GAP SERPL CALCULATED.3IONS-SCNC: 10 MMOL/L (ref 7–16)
BUN BLDV-MCNC: 14 MG/DL (ref 6–23)
CALCIUM SERPL-MCNC: 9.7 MG/DL (ref 8.6–10.2)
CHLORIDE BLD-SCNC: 102 MMOL/L (ref 98–107)
CO2: 26 MMOL/L (ref 22–29)
CREAT SERPL-MCNC: 0.6 MG/DL (ref 0.5–1)
GFR AFRICAN AMERICAN: >60
GFR NON-AFRICAN AMERICAN: >60 ML/MIN/1.73
GLUCOSE BLD-MCNC: 105 MG/DL (ref 74–99)
HBA1C MFR BLD: 6 % (ref 4–5.6)
HCT VFR BLD CALC: 42.5 % (ref 34–48)
HEMOGLOBIN: 14.2 G/DL (ref 11.5–15.5)
MCH RBC QN AUTO: 30.9 PG (ref 26–35)
MCHC RBC AUTO-ENTMCNC: 33.4 % (ref 32–34.5)
MCV RBC AUTO: 92.6 FL (ref 80–99.9)
PDW BLD-RTO: 13.1 FL (ref 11.5–15)
PLATELET # BLD: 263 E9/L (ref 130–450)
PMV BLD AUTO: 9.4 FL (ref 7–12)
POTASSIUM SERPL-SCNC: 4.3 MMOL/L (ref 3.5–5)
RBC # BLD: 4.59 E12/L (ref 3.5–5.5)
SODIUM BLD-SCNC: 138 MMOL/L (ref 132–146)
TSH SERPL DL<=0.05 MIU/L-ACNC: 3.89 UIU/ML (ref 0.27–4.2)
WBC # BLD: 7 E9/L (ref 4.5–11.5)

## 2022-07-26 PROCEDURE — 84443 ASSAY THYROID STIM HORMONE: CPT

## 2022-07-26 PROCEDURE — 83036 HEMOGLOBIN GLYCOSYLATED A1C: CPT

## 2022-07-26 PROCEDURE — 85027 COMPLETE CBC AUTOMATED: CPT

## 2022-07-26 PROCEDURE — 80048 BASIC METABOLIC PNL TOTAL CA: CPT

## 2022-07-26 PROCEDURE — 36415 COLL VENOUS BLD VENIPUNCTURE: CPT

## 2022-08-01 ENCOUNTER — OFFICE VISIT (OUTPATIENT)
Dept: FAMILY MEDICINE CLINIC | Age: 72
End: 2022-08-01
Payer: MEDICARE

## 2022-08-01 VITALS
BODY MASS INDEX: 26.98 KG/M2 | SYSTOLIC BLOOD PRESSURE: 138 MMHG | TEMPERATURE: 97.7 F | RESPIRATION RATE: 16 BRPM | WEIGHT: 158 LBS | HEIGHT: 64 IN | DIASTOLIC BLOOD PRESSURE: 78 MMHG | OXYGEN SATURATION: 97 % | HEART RATE: 60 BPM

## 2022-08-01 DIAGNOSIS — Z00.00 MEDICARE ANNUAL WELLNESS VISIT, SUBSEQUENT: Primary | ICD-10-CM

## 2022-08-01 DIAGNOSIS — R74.8 ELEVATED LIVER ENZYMES: ICD-10-CM

## 2022-08-01 DIAGNOSIS — L30.4 INTERTRIGO: ICD-10-CM

## 2022-08-01 PROCEDURE — 1123F ACP DISCUSS/DSCN MKR DOCD: CPT | Performed by: FAMILY MEDICINE

## 2022-08-01 PROCEDURE — G0439 PPPS, SUBSEQ VISIT: HCPCS | Performed by: FAMILY MEDICINE

## 2022-08-01 RX ORDER — VALACYCLOVIR HCL 1000 MG
1000 TABLET ORAL 3 TIMES DAILY
Qty: 21 TABLET | Refills: 3
Start: 2022-08-01 | End: 2022-09-20

## 2022-08-01 RX ORDER — ACYCLOVIR 50 MG/G
OINTMENT TOPICAL
Qty: 15 G | Refills: 1 | Status: SHIPPED | OUTPATIENT
Start: 2022-08-01 | End: 2022-08-08

## 2022-08-01 RX ORDER — NYSTATIN 100000 U/G
CREAM TOPICAL
Qty: 30 G | Refills: 2 | Status: SHIPPED
Start: 2022-08-01 | End: 2022-09-20

## 2022-08-01 RX ORDER — ENALAPRIL MALEATE 10 MG/1
TABLET ORAL
Qty: 90 TABLET | Refills: 1 | Status: SHIPPED | OUTPATIENT
Start: 2022-08-01

## 2022-08-01 RX ORDER — CYCLOSPORINE 0.5 MG/ML
EMULSION OPHTHALMIC
COMMUNITY
Start: 2022-05-18

## 2022-08-01 RX ORDER — NYSTATIN 100000 [USP'U]/G
POWDER TOPICAL
Qty: 60 G | Refills: 1 | Status: SHIPPED
Start: 2022-08-01 | End: 2022-09-20

## 2022-08-01 RX ORDER — ENALAPRIL MALEATE 10 MG/1
TABLET ORAL
COMMUNITY
Start: 2022-05-23 | End: 2022-08-01 | Stop reason: SDUPTHER

## 2022-08-01 SDOH — ECONOMIC STABILITY: FOOD INSECURITY: WITHIN THE PAST 12 MONTHS, THE FOOD YOU BOUGHT JUST DIDN'T LAST AND YOU DIDN'T HAVE MONEY TO GET MORE.: NEVER TRUE

## 2022-08-01 SDOH — ECONOMIC STABILITY: FOOD INSECURITY: WITHIN THE PAST 12 MONTHS, YOU WORRIED THAT YOUR FOOD WOULD RUN OUT BEFORE YOU GOT MONEY TO BUY MORE.: NEVER TRUE

## 2022-08-01 ASSESSMENT — LIFESTYLE VARIABLES
HOW OFTEN DO YOU HAVE A DRINK CONTAINING ALCOHOL: 2-3 TIMES A WEEK
HOW MANY STANDARD DRINKS CONTAINING ALCOHOL DO YOU HAVE ON A TYPICAL DAY: 1 OR 2
HOW OFTEN DURING THE LAST YEAR HAVE YOU HAD A FEELING OF GUILT OR REMORSE AFTER DRINKING: 0
HOW OFTEN DURING THE LAST YEAR HAVE YOU FAILED TO DO WHAT WAS NORMALLY EXPECTED FROM YOU BECAUSE OF DRINKING: 0
HOW OFTEN DURING THE LAST YEAR HAVE YOU NEEDED AN ALCOHOLIC DRINK FIRST THING IN THE MORNING TO GET YOURSELF GOING AFTER A NIGHT OF HEAVY DRINKING: 0
HAVE YOU OR SOMEONE ELSE BEEN INJURED AS A RESULT OF YOUR DRINKING: 0
HOW OFTEN DURING THE LAST YEAR HAVE YOU FOUND THAT YOU WERE NOT ABLE TO STOP DRINKING ONCE YOU HAD STARTED: 0
HAS A RELATIVE, FRIEND, DOCTOR, OR ANOTHER HEALTH PROFESSIONAL EXPRESSED CONCERN ABOUT YOUR DRINKING OR SUGGESTED YOU CUT DOWN: 0
HOW OFTEN DURING THE LAST YEAR HAVE YOU BEEN UNABLE TO REMEMBER WHAT HAPPENED THE NIGHT BEFORE BECAUSE YOU HAD BEEN DRINKING: 0

## 2022-08-01 ASSESSMENT — PATIENT HEALTH QUESTIONNAIRE - PHQ9
5. POOR APPETITE OR OVEREATING: 0
4. FEELING TIRED OR HAVING LITTLE ENERGY: 0
7. TROUBLE CONCENTRATING ON THINGS, SUCH AS READING THE NEWSPAPER OR WATCHING TELEVISION: 0
SUM OF ALL RESPONSES TO PHQ QUESTIONS 1-9: 1
6. FEELING BAD ABOUT YOURSELF - OR THAT YOU ARE A FAILURE OR HAVE LET YOURSELF OR YOUR FAMILY DOWN: 0
9. THOUGHTS THAT YOU WOULD BE BETTER OFF DEAD, OR OF HURTING YOURSELF: 0
SUM OF ALL RESPONSES TO PHQ QUESTIONS 1-9: 1
3. TROUBLE FALLING OR STAYING ASLEEP: 1
8. MOVING OR SPEAKING SO SLOWLY THAT OTHER PEOPLE COULD HAVE NOTICED. OR THE OPPOSITE, BEING SO FIGETY OR RESTLESS THAT YOU HAVE BEEN MOVING AROUND A LOT MORE THAN USUAL: 0
2. FEELING DOWN, DEPRESSED OR HOPELESS: 0
10. IF YOU CHECKED OFF ANY PROBLEMS, HOW DIFFICULT HAVE THESE PROBLEMS MADE IT FOR YOU TO DO YOUR WORK, TAKE CARE OF THINGS AT HOME, OR GET ALONG WITH OTHER PEOPLE: 0

## 2022-08-01 ASSESSMENT — SOCIAL DETERMINANTS OF HEALTH (SDOH): HOW HARD IS IT FOR YOU TO PAY FOR THE VERY BASICS LIKE FOOD, HOUSING, MEDICAL CARE, AND HEATING?: NOT HARD AT ALL

## 2022-08-01 NOTE — PATIENT INSTRUCTIONS
Personalized Preventive Plan for Shavonne Rodríguez - 8/1/2022  Medicare offers a range of preventive health benefits. Some of the tests and screenings are paid in full while other may be subject to a deductible, co-insurance, and/or copay. Some of these benefits include a comprehensive review of your medical history including lifestyle, illnesses that may run in your family, and various assessments and screenings as appropriate. After reviewing your medical record and screening and assessments performed today your provider may have ordered immunizations, labs, imaging, and/or referrals for you. A list of these orders (if applicable) as well as your Preventive Care list are included within your After Visit Summary for your review. Other Preventive Recommendations:    A preventive eye exam performed by an eye specialist is recommended every 1-2 years to screen for glaucoma; cataracts, macular degeneration, and other eye disorders. A preventive dental visit is recommended every 6 months. Try to get at least 150 minutes of exercise per week or 10,000 steps per day on a pedometer . Order or download the FREE \"Exercise & Physical Activity: Your Everyday Guide\" from [employer]. Call 1-592.653.1746 or search [employer] online. You need 7604-0000 mg of calcium and 2404-4145 IU of vitamin D per day. It is possible to meet your calcium requirement with diet alone, but a vitamin D supplement is usually necessary to meet this goal.  When exposed to the sun, use a sunscreen that protects against both UVA and UVB radiation with an SPF of 30 or greater. Reapply every 2 to 3 hours or after sweating, drying off with a towel, or swimming. Always wear a seat belt when traveling in a car. Always wear a helmet when riding a bicycle or motorcycle.

## 2022-08-01 NOTE — PROGRESS NOTES
Medicare Annual Wellness Visit    Shavonne Rodríguez is here for Medicare AWV (Review labs )    Assessment & Plan   Medicare annual wellness visit, subsequent    Recommendations for Preventive Services Due: see orders and patient instructions/AVS.  Recommended screening schedule for the next 5-10 years is provided to the patient in written form: see Patient Instructions/AVS.     Return for Medicare Annual Wellness Visit in 1 year. Subjective   The following acute and/or chronic problems were also addressed today:    Patient's complete Health Risk Assessment and screening values have been reviewed and are found in Flowsheets. The following problems were reviewed today and where indicated follow up appointments were made and/or referrals ordered. Positive Risk Factor Screenings with Interventions:        Alcohol Screening:  Alcohol Use: Heavy Drinker    Frequency of Alcohol Consumption: 2-3 times a week    Average Number of Drinks: 1 or 2    Frequency of Binge Drinking: Less than monthly     AUDIT-C Score: 4  AUDIT Total Score: 4  An AUDIT-C score of 3-7 indicates potential alcohol risk. An AUDIT Total score of 8 or more is associated with harmful or hazardous drinking. A score of 13 or more in women, and 15 or more in men, is likely to indicate alcohol dependence.   Substance Use - Alcohol Interventions:  No issues        General Health and ACP:  General  In general, how would you say your health is?: Very Good  In the past 7 days, have you experienced any of the following: New or Increased Pain, New or Increased Fatigue, Loneliness, Social Isolation, Stress or Anger?: (!) Yes  Select all that apply: (!) Stress  Do you get the social and emotional support that you need?: Yes  Do you have a Living Will?: Yes    Advance Directives       Power of  Living Will ACP-Advance Directive ACP-Power of     Not on File Not on File Not on File Not on [facility] Interventions:  Stress: patient declines any further evaluation/treatment for this issue      Safety:  Do you have working smoke detectors?: Yes  Do you have any tripping hazards - loose or unsecured carpets or rugs?: No  Do you have any tripping hazards - clutter in doorways, halls, or stairs?: No  Do you have either shower bars, grab bars, non-slip mats or non-slip surfaces in your shower or bathtub?: (!) No  Do all of your stairways have a railing or banister?: Yes  Do you always fasten your seatbelt when you are in a car?: (!) No  Safety Interventions:  Home safety tips provided           Objective   Vitals:    08/01/22 1428   BP: 138/78   Pulse: 60   Resp: 16   Temp: 97.7 °F (36.5 °C)   SpO2: 97%   Weight: 158 lb (71.7 kg)   Height: 5' 4\" (1.626 m)      Body mass index is 27.12 kg/m².       General Appearance: alert and oriented to person, place and time, well developed and well- nourished, in no acute distress  Skin: warm and dry, no rash or erythema  Head: normocephalic and atraumatic  Eyes: pupils equal, round, and reactive to light, extraocular eye movements intact, conjunctivae normal  ENT: tympanic membrane, external ear and ear canal normal bilaterally, nose without deformity, nasal mucosa and turbinates normal without polyps  Neck: supple and non-tender without mass, no thyromegaly or thyroid nodules, no cervical lymphadenopathy  Pulmonary/Chest: clear to auscultation bilaterally- no wheezes, rales or rhonchi, normal air movement, no respiratory distress  Cardiovascular: normal rate, regular rhythm, normal S1 and S2, no murmurs, rubs, clicks, or gallops, distal pulses intact, no carotid bruits  Abdomen: soft, non-tender, non-distended, normal bowel sounds, no masses or organomegaly  Extremities: no cyanosis, clubbing or edema  Musculoskeletal: normal range of motion, no joint swelling, deformity or tenderness  Neurologic: reflexes normal and symmetric, no cranial nerve deficit, gait, coordination and Cholecalciferol (VITAMIN D3) 1000 units CAPS Take by mouth daily Take 1 by mouth daily Yes Historical Provider, MD   Omega-3 Fatty Acids (FISH OIL) 1200 MG CAPS Take by mouth daily Take 2 by mouth everyday Yes Historical Provider, MD   docusate sodium (COLACE) 100 MG capsule Take 100 mg by mouth daily Col-Rite Yes Historical Provider, MD   Wolof GINSENG PO Take by mouth as needed  Yes Historical Provider, MD   Multiple Vitamins-Minerals (CENTRUM CARDIO) TABS Take 1 tablet by mouth daily  Yes Historical Provider, MD   DHA-EPA-VITAMIN E PO Take 4 capsules by mouth Mon- Wed- Friday Yes Historical Provider, MD   Coenzyme Q-10 200 MG CAPS Take 800 mg by mouth daily  Yes Historical Provider, MD   Ascorbic Acid (VITAMIN C) 100 MG tablet Take 100 mg by mouth Twice weekly Yes Historical Provider, MD   methylcellulose 500 MG TABS 1 qd Yes Historical Provider, MD   Ergocalciferol (VITAMIN D2 PO) Take 400 Units by mouth daily  Yes Historical Provider, MD   ALPRAZolam (XANAX) 0.5 MG tablet Take 0.5 mg by mouth as needed.  . Yes Historical Provider, MD   QUEtiapine (SEROQUEL XR) 200 MG extended release tablet Take 200 mg by mouth nightly  Yes Historical Provider, MD   venlafaxine (EFFEXOR XR) 75 MG extended release capsule Take 75 mg by mouth daily  Yes Historical Provider, MD   vitamin E 400 UNIT capsule Take by mouth Yes Historical Provider, MD Zaragoza (Including outside providers/suppliers regularly involved in providing care):   Patient Care Team:  Elaina Tompkins DO as PCP - General (Family Medicine)  Lyudmila Miles DO as PCP - [facility] Empaneled Provider     Reviewed and updated this visit:  Tobacco  Allergies  Meds  Problems  Med Hx  Surg Hx  Soc Hx  Fam Hx

## 2022-08-02 DIAGNOSIS — E78.2 MIXED HYPERLIPIDEMIA: Primary | ICD-10-CM

## 2022-08-25 ENCOUNTER — OFFICE VISIT (OUTPATIENT)
Dept: FAMILY MEDICINE CLINIC | Age: 72
End: 2022-08-25
Payer: MEDICARE

## 2022-08-25 VITALS
HEART RATE: 65 BPM | DIASTOLIC BLOOD PRESSURE: 82 MMHG | RESPIRATION RATE: 18 BRPM | HEIGHT: 65 IN | BODY MASS INDEX: 26.16 KG/M2 | SYSTOLIC BLOOD PRESSURE: 125 MMHG | WEIGHT: 157 LBS | OXYGEN SATURATION: 92 % | TEMPERATURE: 97.2 F

## 2022-08-25 DIAGNOSIS — R06.2 WHEEZE: ICD-10-CM

## 2022-08-25 DIAGNOSIS — G89.29 CHRONIC BILATERAL LOW BACK PAIN WITHOUT SCIATICA: Primary | ICD-10-CM

## 2022-08-25 DIAGNOSIS — M54.50 CHRONIC BILATERAL LOW BACK PAIN WITHOUT SCIATICA: Primary | ICD-10-CM

## 2022-08-25 PROCEDURE — 1123F ACP DISCUSS/DSCN MKR DOCD: CPT | Performed by: FAMILY MEDICINE

## 2022-08-25 PROCEDURE — 99214 OFFICE O/P EST MOD 30 MIN: CPT | Performed by: FAMILY MEDICINE

## 2022-08-25 RX ORDER — PREDNISONE 10 MG/1
TABLET ORAL
Qty: 30 TABLET | Refills: 0 | Status: SHIPPED
Start: 2022-08-25 | End: 2022-09-20

## 2022-08-25 RX ORDER — ALBUTEROL SULFATE 90 UG/1
2 AEROSOL, METERED RESPIRATORY (INHALATION) 4 TIMES DAILY PRN
Qty: 18 G | Refills: 0 | Status: SHIPPED
Start: 2022-08-25 | End: 2022-09-12 | Stop reason: SDUPTHER

## 2022-08-25 ASSESSMENT — PATIENT HEALTH QUESTIONNAIRE - PHQ9
2. FEELING DOWN, DEPRESSED OR HOPELESS: 0
SUM OF ALL RESPONSES TO PHQ9 QUESTIONS 1 & 2: 0
SUM OF ALL RESPONSES TO PHQ QUESTIONS 1-9: 0
1. LITTLE INTEREST OR PLEASURE IN DOING THINGS: 0
SUM OF ALL RESPONSES TO PHQ QUESTIONS 1-9: 0

## 2022-08-25 NOTE — PROGRESS NOTES
8/31/2022    Chief Complaint   Patient presents with    Wheezing     Has sjogren's. Had 3 negative covid tests. No other symptoms. Wheezing only when lying down. LIT Zurita is a 74 y.o. patient that presents today for:    Wheezing for 6 weeks. No history of chronic lung conditions, chest pain, gerd. Does have sjogrens, always dry. LPB- would like to see chiro, chronic, Does deny bowel or bladder incontinence or saddle anesthesia. Patient's past medical, surgical, social and/or family history reviewed, updated in chart, and are non-contributory (unless otherwise stated). Medications and allergies also reviewed and updated in chart. ROS negative unless otherwise specified    Physical Exam  Temp Readings from Last 3 Encounters:   08/25/22 97.2 °F (36.2 °C)   08/01/22 97.7 °F (36.5 °C)   02/08/22 97.5 °F (36.4 °C)     Wt Readings from Last 3 Encounters:   08/25/22 157 lb (71.2 kg)   08/01/22 158 lb (71.7 kg)   03/02/22 165 lb 12.8 oz (75.2 kg)     BP Readings from Last 3 Encounters:   08/25/22 125/82   08/01/22 138/78   03/02/22 122/78     Pulse Readings from Last 3 Encounters:   08/25/22 65   08/01/22 60   02/08/22 81       General appearance: alert, well appearing, and in no distress, oriented to person, place, and time and normal appearing weight. CVS exam: normal rate, regular rhythm, normal S1, S2, no murmurs, rubs, clicks or gallops. Radial pulses 2+ bilateral.  PT/DP pulse 2+ bilat. No C/C/E    Chest: clear to auscultation, no wheezes, rales or rhonchi, symmetric air entry.      Abdomen: Soft, non-tender, non-distended, positive BS in all 4 quadrants    Extremities:Dorsalis pedis pulses palpated bilaterally, no clubbing, cyanosis, edema or erythema,     SKIN: no lesions, jaundice, petechiae, pallor, cyanosis, ecchymosis    NEURO: gross motor exam normal by observation, gait normal    Mental status - alert, oriented to person, place, and time, normal mood, behavior, speech, dress, motor activity, and thought processes      Assessment/Plan  Rose Haskins was seen today for wheezing. Diagnoses and all orders for this visit:    Chronic bilateral low back pain without sciatica  -     [facility], [address], [place]    Wheeze  -     predniSONE (DELTASONE) 10 MG tablet; Take 3 tabs po qd for 5 days, then 2 tabs po qd for 5 days then 1 tab po qd for 5 days then stop. -     albuterol sulfate HFA (VENTOLIN HFA) 108 (90 Base) MCG/ACT inhaler; Inhale 2 puffs into the lungs 4 times daily as needed for Wheezing  -     XR CHEST (2 VW); Future        Return if symptoms worsen or fail to improve. Lola Lemon, DO    Call or go to ED immediately if symptoms worsen or persist.    Educational materials and/or home exercises printed for patient's review and were included in patient instructions on his/her After Visit Summary and given to patient at the end of visit. Counseled regarding above diagnosis, including possible risks and complications,  especially if left uncontrolled. Counseled regarding the possible side effects, risks, benefits and alternatives to treatment; patient and/or guardian verbalizes understanding, agrees, feels comfortable with and wishes to proceed with above treatment plan. Advised patient to call with any new medication issues, and read all Rx info from pharmacy to assure aware of all possible risks and side effects of medication before taking. Reviewed age and gender appropriate health screening exams and vaccinations. Advised patient regarding importance of keeping up with recommended health maintenance and to schedule as soon as possible if overdue, as this is important in assessing for undiagnosed pathology, especially cancer, as well as protecting against potentially harmful/life threatening disease.       Patient and/or guardian verbalizes understanding and agrees with above counseling, assessment and plan.    All questions answered.

## 2022-09-12 ENCOUNTER — HOSPITAL ENCOUNTER (OUTPATIENT)
Age: 72
Discharge: HOME | End: 2022-09-12
Payer: MEDICARE

## 2022-09-12 ENCOUNTER — HOSPITAL ENCOUNTER (OUTPATIENT)
Age: 72
Discharge: HOME | End: 2022-09-14
Payer: MEDICARE

## 2022-09-12 ENCOUNTER — HOSPITAL ENCOUNTER (OUTPATIENT)
Dept: GENERAL RADIOLOGY | Age: 72
Discharge: HOME | End: 2022-09-14
Payer: MEDICARE

## 2022-09-12 DIAGNOSIS — R06.2 WHEEZE: ICD-10-CM

## 2022-09-12 DIAGNOSIS — E78.2 MIXED HYPERLIPIDEMIA: ICD-10-CM

## 2022-09-12 DIAGNOSIS — R74.8 ELEVATED LIVER ENZYMES: ICD-10-CM

## 2022-09-12 LAB
ALBUMIN SERPL-MCNC: 4.2 G/DL (ref 3.5–5.2)
ALP BLD-CCNC: 75 U/L (ref 35–104)
ALT SERPL-CCNC: 22 U/L (ref 0–32)
ANION GAP SERPL CALCULATED.3IONS-SCNC: 10 MMOL/L (ref 7–16)
AST SERPL-CCNC: 29 U/L (ref 0–31)
BILIRUB SERPL-MCNC: 0.5 MG/DL (ref 0–1.2)
BILIRUBIN DIRECT: <0.2 MG/DL (ref 0–0.3)
BILIRUBIN, INDIRECT: NORMAL MG/DL (ref 0–1)
BUN BLDV-MCNC: 13 MG/DL (ref 6–23)
CALCIUM SERPL-MCNC: 9.5 MG/DL (ref 8.6–10.2)
CHLORIDE BLD-SCNC: 102 MMOL/L (ref 98–107)
CHOLESTEROL, TOTAL: 154 MG/DL (ref 0–199)
CO2: 25 MMOL/L (ref 22–29)
CREAT SERPL-MCNC: 0.6 MG/DL (ref 0.5–1)
GFR AFRICAN AMERICAN: >60
GFR NON-AFRICAN AMERICAN: >60 ML/MIN/1.73
GLUCOSE BLD-MCNC: 131 MG/DL (ref 74–99)
HDLC SERPL-MCNC: 55 MG/DL
LDL CHOLESTEROL CALCULATED: 77 MG/DL (ref 0–99)
POTASSIUM SERPL-SCNC: 4 MMOL/L (ref 3.5–5)
SODIUM BLD-SCNC: 137 MMOL/L (ref 132–146)
TOTAL PROTEIN: 6.8 G/DL (ref 6.4–8.3)
TRIGL SERPL-MCNC: 112 MG/DL (ref 0–149)
VLDLC SERPL CALC-MCNC: 22 MG/DL

## 2022-09-12 PROCEDURE — 80061 LIPID PANEL: CPT

## 2022-09-12 PROCEDURE — 36415 COLL VENOUS BLD VENIPUNCTURE: CPT

## 2022-09-12 PROCEDURE — 82248 BILIRUBIN DIRECT: CPT

## 2022-09-12 PROCEDURE — 80053 COMPREHEN METABOLIC PANEL: CPT

## 2022-09-12 PROCEDURE — 71046 X-RAY EXAM CHEST 2 VIEWS: CPT

## 2022-09-12 RX ORDER — DOXYCYCLINE HYCLATE 100 MG
100 TABLET ORAL 2 TIMES DAILY
Qty: 20 TABLET | Refills: 0 | Status: SHIPPED | OUTPATIENT
Start: 2022-09-12 | End: 2022-09-22

## 2022-09-12 RX ORDER — ALBUTEROL SULFATE 90 UG/1
2 AEROSOL, METERED RESPIRATORY (INHALATION) 4 TIMES DAILY PRN
Qty: 18 G | Refills: 0 | Status: SHIPPED | OUTPATIENT
Start: 2022-09-12

## 2022-09-15 ENCOUNTER — OFFICE VISIT (OUTPATIENT)
Dept: RHEUMATOLOGY | Age: 72
End: 2022-09-15
Payer: MEDICARE

## 2022-09-15 VITALS — HEIGHT: 64 IN | WEIGHT: 154 LBS | BODY MASS INDEX: 26.29 KG/M2

## 2022-09-15 DIAGNOSIS — M35.01 SJOGREN'S SYNDROME WITH KERATOCONJUNCTIVITIS SICCA (HCC): Primary | ICD-10-CM

## 2022-09-15 DIAGNOSIS — E78.2 MIXED HYPERLIPIDEMIA: ICD-10-CM

## 2022-09-15 PROCEDURE — 99214 OFFICE O/P EST MOD 30 MIN: CPT | Performed by: INTERNAL MEDICINE

## 2022-09-15 PROCEDURE — 1123F ACP DISCUSS/DSCN MKR DOCD: CPT | Performed by: INTERNAL MEDICINE

## 2022-09-15 ASSESSMENT — ENCOUNTER SYMPTOMS
TROUBLE SWALLOWING: 0
VOMITING: 0
COUGH: 1
DIARRHEA: 0
NAUSEA: 0
ABDOMINAL PAIN: 0
COLOR CHANGE: 0
SHORTNESS OF BREATH: 1

## 2022-09-15 NOTE — PROGRESS NOTES
also had a left salivary gland removed prior to this diagnosis due to recurrent infection. She has a lot of issues with dry mouth but no oral ulcers or poor dentition though she does get a dry cough. The cough has been going on for about the last year. She has dry eyes and has plugs in place and uses Restasis. She has some osteoarthritis but to this point no known extraglandular manifestations. Feels fatigued but does not sleep well and snores at night. Past Medical History:   Diagnosis Date    Adverse effect of female hormone replacement therapy     Dr. Pricila Verde    Bipolar disorder [facility])     Mixed, has required hospitalization in the past Dr. Mira Faith    Depression     GERD (gastroesophageal reflux disease)     GERD Management per Dr. Alan Napoles    Hyperlipidemia     Management per [facility], clinical Cardiology, Dr. Margareth Marrufo    Hypertension     Management per [facility] clinical Cardiology    Hypothyroidism     with nodule - Managed by Dr. De Oliveira Render    Sjogren's syndrome with keratoconjunctivitis sicca [facility])     Thyroid disease         Review of Systems   Constitutional:  Positive for fatigue. Negative for fever. HENT:  Negative for mouth sores and trouble swallowing. Respiratory:  Positive for cough and shortness of breath. Cardiovascular:  Negative for chest pain. Gastrointestinal:  Negative for abdominal pain, diarrhea, nausea and vomiting. Genitourinary:  Negative for dysuria and hematuria. Musculoskeletal:  Positive for arthralgias. Negative for joint swelling. Skin:  Negative for color change and rash. Neurological:  Negative for weakness and numbness. Hematological:  Negative for adenopathy. Psychiatric/Behavioral:  Positive for sleep disturbance. All other systems reviewed and are negative. Objective   There were no vitals filed for this visit. Physical Exam  Constitutional:       General: She is not in acute distress. Appearance: Normal appearance. HENT:      Head: Normocephalic and atraumatic. Nose: Nose normal.   Eyes:      General: No scleral icterus. Pulmonary:      Effort: Pulmonary effort is normal.      Breath sounds: Normal breath sounds. Musculoskeletal:         General: Deformity present. No swelling or tenderness. Comments: She has Heberden's and Belinda's nodes and squaring of the first CMC joints bilaterally but no evidence of synovitis on exam.   Skin:     General: Skin is warm and dry. Findings: No rash. Neurological:      General: No focal deficit present. Mental Status: She is alert and oriented to person, place, and time. Mental status is at baseline. Psychiatric:         Mood and Affect: Mood normal.         Behavior: Behavior normal.          Lab Results   Component Value Date    WBC 7.0 07/26/2022    HGB 14.2 07/26/2022    HCT 42.5 07/26/2022    MCV 92.6 07/26/2022     07/26/2022     Lab Results   Component Value Date     09/12/2022    K 4.0 09/12/2022     09/12/2022    CO2 25 09/12/2022    BUN 13 09/12/2022    CREATININE 0.6 09/12/2022    GLUCOSE 131 (H) 09/12/2022    CALCIUM 9.5 09/12/2022    PROT 6.8 09/12/2022    LABALBU 4.2 09/12/2022    BILITOT 0.5 09/12/2022    ALKPHOS 75 09/12/2022    AST 29 09/12/2022    ALT 22 09/12/2022    LABGLOM >60 09/12/2022    GFRAA >60 09/12/2022     Lab Results   Component Value Date    GLENNY NEGATIVE 03/28/2022     No results found for: RHEUMFACTOR  No results found for: SEDRATE  No results found for: CRP         An electronic signature was used to authenticate this note. This note was generated with a voice recognition dictation system. Please disregard any errors or omission which have escaped my review.     --Ashley Altamirano,

## 2022-09-20 ENCOUNTER — OFFICE VISIT (OUTPATIENT)
Dept: FAMILY MEDICINE CLINIC | Age: 72
End: 2022-09-20
Payer: MEDICARE

## 2022-09-20 VITALS
OXYGEN SATURATION: 96 % | HEIGHT: 64 IN | SYSTOLIC BLOOD PRESSURE: 103 MMHG | TEMPERATURE: 97.2 F | BODY MASS INDEX: 26.29 KG/M2 | HEART RATE: 56 BPM | RESPIRATION RATE: 18 BRPM | WEIGHT: 154 LBS | DIASTOLIC BLOOD PRESSURE: 62 MMHG

## 2022-09-20 DIAGNOSIS — Z13.820 SCREENING FOR OSTEOPOROSIS: ICD-10-CM

## 2022-09-20 DIAGNOSIS — J18.9 PNEUMONIA OF RIGHT LOWER LOBE DUE TO INFECTIOUS ORGANISM: Primary | ICD-10-CM

## 2022-09-20 PROBLEM — I65.23 CAROTID ARTERY PLAQUE, BILATERAL: Status: ACTIVE | Noted: 2018-09-14

## 2022-09-20 PROCEDURE — 99213 OFFICE O/P EST LOW 20 MIN: CPT | Performed by: FAMILY MEDICINE

## 2022-09-20 PROCEDURE — 1123F ACP DISCUSS/DSCN MKR DOCD: CPT | Performed by: FAMILY MEDICINE

## 2022-09-20 RX ORDER — LANSOPRAZOLE 15 MG/1
15 CAPSULE, DELAYED RELEASE ORAL DAILY
COMMUNITY
Start: 2010-10-05

## 2022-09-20 NOTE — PROGRESS NOTES
Chief Complaint   Patient presents with    Pneumonia       HPI:  Patient is here for follow-up of pneumonia. States she has been taking medication for 10 days. Pt has no questions or concerns at this time. HM reviewed. Pt is not up to date. Pt is due for a dexa scan. Blood work was completed 9/15. Patient's past medical, surgical, social and/or family history reviewed, updated in chart, and are non-contributory (unless otherwise stated). Medications and allergies also reviewed and updated in chart. Review of Systems:  Constitutional:  No fever, no fatigue, no chills, no headaches, no weight change  Dermatology:  No rash, no mole, no dry or sensitive skin  ENT:  No cough, no sore throat, no sinus pain, no runny nose, no ear pain  Cardiology:  No chest pain, no palpitations, no leg edema, no shortness of breath, no PND  Gastroenterology:  No dysphagia, no abdominal pain, no nausea, no vomiting, no constipation, no diarrhea, no heartburn  Musculoskeletal:  No joint pain, no leg cramps, no back pain, no muscle aches  Respiratory:  No shortness of breath, no orthopnea, no wheezing, no LING, no hemoptysis  Urology:  No blood in the urine, no urinary frequency, no urinary incontinence, no urinary urgency, no nocturia, no dysuria      Patient's past medical, surgical, social and/or family history reviewed, updated in chart, and are non-contributory (unless otherwise stated). Medications and allergies also reviewed and updated in chart.      Physical Exam  /62   Pulse 56   Temp 97.2 °F (36.2 °C)   Resp 18   Ht 5' 4\" (1.626 m)   Wt 154 lb (69.9 kg)   SpO2 96%   BMI 26.43 kg/m²   Wt Readings from Last 3 Encounters:   09/20/22 154 lb (69.9 kg)   09/15/22 154 lb (69.9 kg)   08/25/22 157 lb (71.2 kg)     Temp Readings from Last 3 Encounters:   09/20/22 97.2 °F (36.2 °C)   08/25/22 97.2 °F (36.2 °C)   08/01/22 97.7 °F (36.5 °C)     BP Readings from Last 3 Encounters:   09/20/22 103/62   08/25/22 125/82 08/01/22 138/78     Pulse Readings from Last 3 Encounters:   09/20/22 56   08/25/22 65   08/01/22 60       General appearance: alert, well appearing, and in no distress, oriented to person, place, and time and normal appearing weight. CVS exam: normal rate, regular rhythm, normal S1, S2, no murmurs, rubs, clicks or gallops. Radial pulses 2+ bilateral.  PT/DP pulse 2+ bilat. No C/C/E    Chest: clear to auscultation, no wheezes, rales or rhonchi, symmetric air entry. Musculoskeletal: MS 5/5, DTR 2/4 x4 extremities, FROM F/E spine, no tenderness, obvious masses or deformities. Gait normal.     Abdomen: Soft, non-tender, non-distended, positive BS in all 4 quadrants    Extremities:Dorsalis pedis pulses palpated bilaterally, no clubbing, cyanosis, edema or erythema     SKIN: no lesions, jaundice, petechiae, pallor, cyanosis, ecchymosis    NEURO: gross motor exam normal by observation, gait normal      Assessment/Plan  Lola Andrade was seen today for pneumonia. Diagnoses and all orders for this visit:    Screening for osteoporosis    Pneumonia of right lower lobe due to infectious organism  -     XR CHEST (2 VW); Future          No follow-ups on file. Lola Lemon, DO    Call or go to ED immediately if symptoms worsen or persist.    Educational materials and/or home exercises printed for patient's review and were included in patient instructions on his/her After Visit Summary and given to patient at the end of visit. Counseled regarding above diagnosis, including possible risks and complications,  especially if left uncontrolled. Counseled regarding the possible side effects, risks, benefits and alternatives to treatment; patient and/or guardian verbalizes understanding, agrees, feels comfortable with and wishes to proceed with above treatment plan.     Advised patient to call with any new medication issues, and read all Rx info from pharmacy to assure aware of all possible risks and side effects of medication before taking. Reviewed age and gender appropriate health screening exams and vaccinations. Advised patient regarding importance of keeping up with recommended health maintenance and to schedule as soon as possible if overdue, as this is important in assessing for undiagnosed pathology, especially cancer, as well as protecting against potentially harmful/life threatening disease. Patient and/or guardian verbalizes understanding and agrees with above counseling, assessment and plan. All questions answered.

## 2022-09-28 ENCOUNTER — HOSPITAL ENCOUNTER (OUTPATIENT)
Age: 72
Discharge: HOME | End: 2022-09-30
Payer: MEDICARE

## 2022-09-28 ENCOUNTER — HOSPITAL ENCOUNTER (OUTPATIENT)
Dept: ULTRASOUND IMAGING | Age: 72
Discharge: HOME | End: 2022-09-30
Payer: MEDICARE

## 2022-09-28 ENCOUNTER — HOSPITAL ENCOUNTER (OUTPATIENT)
Dept: GENERAL RADIOLOGY | Age: 72
Discharge: HOME | End: 2022-09-30
Payer: MEDICARE

## 2022-09-28 DIAGNOSIS — J18.9 PNEUMONIA OF RIGHT LOWER LOBE DUE TO INFECTIOUS ORGANISM: ICD-10-CM

## 2022-09-28 DIAGNOSIS — E04.2 MULTINODULAR GOITER: ICD-10-CM

## 2022-09-28 PROCEDURE — 76536 US EXAM OF HEAD AND NECK: CPT

## 2022-09-28 PROCEDURE — 71046 X-RAY EXAM CHEST 2 VIEWS: CPT

## 2022-10-10 DIAGNOSIS — E03.8 HYPOTHYROIDISM DUE TO HASHIMOTO'S THYROIDITIS: ICD-10-CM

## 2022-10-10 DIAGNOSIS — E06.3 HYPOTHYROIDISM DUE TO HASHIMOTO'S THYROIDITIS: ICD-10-CM

## 2022-10-10 RX ORDER — LEVOTHYROXINE SODIUM 0.07 MG/1
TABLET ORAL
Qty: 30 TABLET | Refills: 0 | Status: SHIPPED | OUTPATIENT
Start: 2022-10-10

## 2022-10-31 ENCOUNTER — TELEPHONE (OUTPATIENT)
Dept: PHARMACY | Facility: CLINIC | Age: 72
End: 2022-10-31

## 2022-10-31 NOTE — TELEPHONE ENCOUNTER
[facility] CLINICAL PHARMACY: ADHERENCE REVIEW  Identified care gap per [place]: fills at [facility]: ACE/ARB and Statin adherence    Last Visit: 22    ASSESSMENT  ACE/ARB ADHERENCE    Insurance Records claims through 10/24/22; JERICHO Puentes =  100%; Potential Fail Date: 22 ):   ENALAPRIL MALEATE 10 MG TAB due to refill 10/30/22 for 90 day supply. Per [facility]:   ENALAPRIL MALEATE 10 MG TAB last picked up on 10/29/22 for 90 day supply. 0 refills remaining. Billed through [place]     BP Readings from Last 3 Encounters:   22 103/62   22 125/82   22 138/78     Estimated Creatinine Clearance: 79 mL/min (based on SCr of 0.6 mg/dL). John Vasques 15[address] Records claims through 10/24/22; JERICHO Puentes =  78%; Potential Fail Date: 22 ):   ATORVASTATIN 80 MG TABLET due to refill 11/3/22 for 90 day supply. Per [facility]:   ATORVASTATIN 80 MG TABLET last picked up on 10/29/22 for 90 day supply. 0 refills remaining. Billed through [employer] Results   Component Value Date    CHOL 154 2022    TRIG 112 2022    HDL 55 2022    LDLCALC 77 2022     ALT   Date Value Ref Range Status   2022 22 0 - 32 U/L Final     AST   Date Value Ref Range Status   2022 29 0 - 31 U/L Final     The 10-year ASCVD risk score (Saadia DK, et al., 2019) is: 12.3%    Values used to calculate the score:      Age: 74 years      Sex: Female      Is Non- : No      Diabetic: No      Tobacco smoker: No      Systolic Blood Pressure: 240 mmHg      Is BP treated: Yes      HDL Cholesterol: 55 mg/dL      Total Cholesterol: 154 mg/dL     PLAN    Patient recently picked up Atorvastatin for 90 d/s. Appears adherent at this time and no outreach planned for now.        Future Appointments   Date Time Provider Ruma Carrillo   2022  9:40 AM JENNY Barahona - CNS [facility]   2023  1:15 PM DO Rosas [facility]   6/21/2023 12:30 PM JENNY Hernandez CNM AFLK[facility]OS [facility] [place]   8/7/2023  1:15 PM [facility]   9/14/2023 10:20 AM Edy Huntley DO BDM RHEUM [address]   Direct: [phone]  Phone: toll free [phone]      For Pharmacy Admin Tracking Only    CPA in place:  No  Gap Closed?: Yes   Time Spent (min): 15

## 2022-12-06 ENCOUNTER — TELEPHONE (OUTPATIENT)
Dept: FAMILY MEDICINE CLINIC | Age: 72
End: 2022-12-06

## 2022-12-06 ENCOUNTER — OFFICE VISIT (OUTPATIENT)
Dept: FAMILY MEDICINE CLINIC | Age: 72
End: 2022-12-06
Payer: MEDICARE

## 2022-12-06 VITALS
SYSTOLIC BLOOD PRESSURE: 98 MMHG | WEIGHT: 157.8 LBS | BODY MASS INDEX: 26.29 KG/M2 | OXYGEN SATURATION: 96 % | RESPIRATION RATE: 16 BRPM | TEMPERATURE: 97 F | DIASTOLIC BLOOD PRESSURE: 63 MMHG | HEART RATE: 77 BPM | HEIGHT: 65 IN

## 2022-12-06 DIAGNOSIS — Z13.820 SCREENING FOR OSTEOPOROSIS: ICD-10-CM

## 2022-12-06 DIAGNOSIS — R19.7 DIARRHEA, UNSPECIFIED TYPE: Primary | ICD-10-CM

## 2022-12-06 PROBLEM — N95.2 ATROPHIC VAGINITIS: Status: ACTIVE | Noted: 2022-12-06

## 2022-12-06 PROBLEM — E27.40: Status: RESOLVED | Noted: 2021-10-13 | Resolved: 2022-12-06

## 2022-12-06 PROBLEM — N39.46 MIXED INCONTINENCE: Status: ACTIVE | Noted: 2022-12-06

## 2022-12-06 PROCEDURE — 1123F ACP DISCUSS/DSCN MKR DOCD: CPT | Performed by: FAMILY MEDICINE

## 2022-12-06 PROCEDURE — 3074F SYST BP LT 130 MM HG: CPT | Performed by: FAMILY MEDICINE

## 2022-12-06 PROCEDURE — 99214 OFFICE O/P EST MOD 30 MIN: CPT | Performed by: FAMILY MEDICINE

## 2022-12-06 PROCEDURE — 3078F DIAST BP <80 MM HG: CPT | Performed by: FAMILY MEDICINE

## 2022-12-06 RX ORDER — METRONIDAZOLE 500 MG/1
500 TABLET ORAL 3 TIMES DAILY
Qty: 30 TABLET | Refills: 0 | Status: SHIPPED | OUTPATIENT
Start: 2022-12-06 | End: 2022-12-16

## 2022-12-06 NOTE — PROGRESS NOTES
12/7/2022    Chief Complaint   Patient presents with    Diarrhea       HPI    Gissel Fontana is a 74 y.o. patient that presents today for:    Diarrhea:    Patient is here today with complaints of diarrhea. This is a/an recent problem. Pt states she has been having abdominal pain and diarrhea for 10 days. States it is brown. HM reviewed. Labs are ordered, not completed. She is having several stools per day. Drank wine and ate a lot of grapes over the holiday which seems to have precipitated the attack        Patient's past medical, surgical, social and/or family history reviewed, updated in chart, and are non-contributory (unless otherwise stated). Medications and allergies also reviewed and updated in chart. ROS negative unless otherwise specified    Physical Exam  Temp Readings from Last 3 Encounters:   12/06/22 97 °F (36.1 °C)   09/20/22 97.2 °F (36.2 °C)   08/25/22 97.2 °F (36.2 °C)     Wt Readings from Last 3 Encounters:   12/06/22 157 lb 12.8 oz (71.6 kg)   09/20/22 154 lb (69.9 kg)   09/15/22 154 lb (69.9 kg)     BP Readings from Last 3 Encounters:   12/06/22 98/63   09/20/22 103/62   08/25/22 125/82     Pulse Readings from Last 3 Encounters:   12/06/22 77   09/20/22 56   08/25/22 65       General appearance: alert, well appearing, and in no distress, oriented to person, place, and time and normal appearing weight. CVS exam: normal rate, regular rhythm, normal S1, S2, no murmurs, rubs, clicks or gallops. Radial pulses 2+ bilateral.  PT/DP pulse 2+ bilat. No C/C/E    Chest: clear to auscultation, no wheezes, rales or rhonchi, symmetric air entry.      Abdomen: Soft, non-tender, non-distended, positive BS in all 4 quadrants    Extremities:Dorsalis pedis pulses palpated bilaterally, no clubbing, cyanosis, edema or erythema,     SKIN: no lesions, jaundice, petechiae, pallor, cyanosis, ecchymosis    NEURO: gross motor exam normal by observation, gait normal    Mental status - alert, oriented to person, place, and time, normal mood, behavior, speech, dress, motor activity, and thought processes      Assessment/Plan  Ganesh Tarango was seen today for diarrhea. Diagnoses and all orders for this visit:    Diarrhea, unspecified type  - BRAT diet  - No wine  -     Culture, Stool; Future- in 2 weeks if no resolution  -     START:metroNIDAZOLE (FLAGYL) 500 MG tablet; Take 1 tablet by mouth 3 times daily for 10 days    Screening for osteoporosis        Return if symptoms worsen or fail to improve. Lola Lemon, DO    Call or go to ED immediately if symptoms worsen or persist.    Educational materials and/or home exercises printed for patient's review and were included in patient instructions on his/her After Visit Summary and given to patient at the end of visit. Counseled regarding above diagnosis, including possible risks and complications,  especially if left uncontrolled. Counseled regarding the possible side effects, risks, benefits and alternatives to treatment; patient and/or guardian verbalizes understanding, agrees, feels comfortable with and wishes to proceed with above treatment plan. Advised patient to call with any new medication issues, and read all Rx info from pharmacy to assure aware of all possible risks and side effects of medication before taking. Reviewed age and gender appropriate health screening exams and vaccinations. Advised patient regarding importance of keeping up with recommended health maintenance and to schedule as soon as possible if overdue, as this is important in assessing for undiagnosed pathology, especially cancer, as well as protecting against potentially harmful/life threatening disease. Patient and/or guardian verbalizes understanding and agrees with above counseling, assessment and plan. All questions answered.

## 2022-12-14 ENCOUNTER — TELEPHONE (OUTPATIENT)
Dept: FAMILY MEDICINE CLINIC | Age: 72
End: 2022-12-14

## 2022-12-14 DIAGNOSIS — E78.2 MIXED HYPERLIPIDEMIA: Primary | ICD-10-CM

## 2022-12-14 NOTE — TELEPHONE ENCOUNTER
Betty Santiago called in this this morning and said that her Rheumatologist is ordering blood work for her and she wanted to know if while she is there having blood work done can we send over a lab order for a thyroid profile and a TSH sent also?

## 2022-12-30 DIAGNOSIS — R73.9 ELEVATED BLOOD SUGAR: Primary | ICD-10-CM

## 2023-01-09 ENCOUNTER — HOSPITAL ENCOUNTER (OUTPATIENT)
Age: 73
Discharge: HOME | End: 2023-01-09
Payer: MEDICARE

## 2023-01-09 DIAGNOSIS — E78.2 MIXED HYPERLIPIDEMIA: ICD-10-CM

## 2023-01-09 DIAGNOSIS — M35.01 SJOGREN'S SYNDROME WITH KERATOCONJUNCTIVITIS SICCA (HCC): ICD-10-CM

## 2023-01-09 DIAGNOSIS — E34.9 HORMONE IMBALANCE: ICD-10-CM

## 2023-01-09 LAB
ALBUMIN SERPL-MCNC: 4.2 G/DL (ref 3.5–5.2)
ALP BLD-CCNC: 54 U/L (ref 35–104)
ALT SERPL-CCNC: 36 U/L (ref 0–32)
ANION GAP SERPL CALCULATED.3IONS-SCNC: 10 MMOL/L (ref 7–16)
AST SERPL-CCNC: 39 U/L (ref 0–31)
BACTERIA: ABNORMAL /HPF
BASOPHILS ABSOLUTE: 0.03 E9/L (ref 0–0.2)
BASOPHILS RELATIVE PERCENT: 0.8 % (ref 0–2)
BILIRUB SERPL-MCNC: 0.6 MG/DL (ref 0–1.2)
BILIRUBIN URINE: NEGATIVE
BLOOD, URINE: NEGATIVE
BUN BLDV-MCNC: 14 MG/DL (ref 6–23)
C-REACTIVE PROTEIN: 0.6 MG/DL (ref 0–0.4)
CALCIUM SERPL-MCNC: 9.1 MG/DL (ref 8.6–10.2)
CHLORIDE BLD-SCNC: 105 MMOL/L (ref 98–107)
CLARITY: ABNORMAL
CO2: 24 MMOL/L (ref 22–29)
COLOR: YELLOW
CREAT SERPL-MCNC: 0.6 MG/DL (ref 0.5–1)
EOSINOPHILS ABSOLUTE: 0.09 E9/L (ref 0.05–0.5)
EOSINOPHILS RELATIVE PERCENT: 2.3 % (ref 0–6)
EPITHELIAL CELLS, UA: ABNORMAL /HPF
ESTRADIOL LEVEL: 60.6 PG/ML
GFR SERPL CREATININE-BSD FRML MDRD: >60 ML/MIN/1.73
GLUCOSE BLD-MCNC: 116 MG/DL (ref 74–99)
GLUCOSE URINE: NEGATIVE MG/DL
HCT VFR BLD CALC: 43.7 % (ref 34–48)
HEMOGLOBIN: 14.9 G/DL (ref 11.5–15.5)
IMMATURE GRANULOCYTES #: 0 E9/L
IMMATURE GRANULOCYTES %: 0 % (ref 0–5)
KETONES, URINE: NEGATIVE MG/DL
LEUKOCYTE ESTERASE, URINE: NEGATIVE
LYMPHOCYTES ABSOLUTE: 1.92 E9/L (ref 1.5–4)
LYMPHOCYTES RELATIVE PERCENT: 48.1 % (ref 20–42)
MCH RBC QN AUTO: 30.7 PG (ref 26–35)
MCHC RBC AUTO-ENTMCNC: 34.1 % (ref 32–34.5)
MCV RBC AUTO: 89.9 FL (ref 80–99.9)
MONOCYTES ABSOLUTE: 0.47 E9/L (ref 0.1–0.95)
MONOCYTES RELATIVE PERCENT: 11.8 % (ref 2–12)
NEUTROPHILS ABSOLUTE: 1.48 E9/L (ref 1.8–7.3)
NEUTROPHILS RELATIVE PERCENT: 37 % (ref 43–80)
NITRITE, URINE: NEGATIVE
PDW BLD-RTO: 13.9 FL (ref 11.5–15)
PH UA: 6 (ref 5–9)
PLATELET # BLD: 231 E9/L (ref 130–450)
PMV BLD AUTO: 9.4 FL (ref 7–12)
POTASSIUM SERPL-SCNC: 3.7 MMOL/L (ref 3.5–5)
PROTEIN UA: NEGATIVE MG/DL
RBC # BLD: 4.86 E12/L (ref 3.5–5.5)
RBC UA: ABNORMAL /HPF (ref 0–2)
RHEUMATOID FACTOR: <10 IU/ML (ref 0–13)
SEDIMENTATION RATE, ERYTHROCYTE: 11 MM/HR (ref 0–20)
SODIUM BLD-SCNC: 139 MMOL/L (ref 132–146)
SPECIFIC GRAVITY UA: >=1.03 (ref 1–1.03)
TOTAL PROTEIN: 6.4 G/DL (ref 6.4–8.3)
TSH SERPL DL<=0.05 MIU/L-ACNC: 3.72 UIU/ML (ref 0.27–4.2)
UROBILINOGEN, URINE: 0.2 E.U./DL
WBC # BLD: 4 E9/L (ref 4.5–11.5)
WBC UA: ABNORMAL /HPF (ref 0–5)

## 2023-01-09 PROCEDURE — 86431 RHEUMATOID FACTOR QUANT: CPT

## 2023-01-09 PROCEDURE — 84144 ASSAY OF PROGESTERONE: CPT

## 2023-01-09 PROCEDURE — 81001 URINALYSIS AUTO W/SCOPE: CPT

## 2023-01-09 PROCEDURE — 85025 COMPLETE CBC W/AUTO DIFF WBC: CPT

## 2023-01-09 PROCEDURE — 86140 C-REACTIVE PROTEIN: CPT

## 2023-01-09 PROCEDURE — 85651 RBC SED RATE NONAUTOMATED: CPT

## 2023-01-09 PROCEDURE — 80053 COMPREHEN METABOLIC PANEL: CPT

## 2023-01-09 PROCEDURE — 36415 COLL VENOUS BLD VENIPUNCTURE: CPT

## 2023-01-09 PROCEDURE — 82670 ASSAY OF TOTAL ESTRADIOL: CPT

## 2023-01-09 PROCEDURE — 84443 ASSAY THYROID STIM HORMONE: CPT

## 2023-01-09 PROCEDURE — 84165 PROTEIN E-PHORESIS SERUM: CPT

## 2023-01-10 LAB — PROGESTERONE LEVEL: 2.62 NG/ML

## 2023-01-11 DIAGNOSIS — M35.01 SJOGREN'S SYNDROME WITH KERATOCONJUNCTIVITIS SICCA (HCC): Primary | ICD-10-CM

## 2023-01-11 LAB
ALBUMIN SERPL-MCNC: 3 G/DL (ref 3.5–4.7)
ALPHA-1-GLOBULIN: 0.4 G/DL (ref 0.2–0.4)
ALPHA-2-GLOBULIN: 0.9 G/DL (ref 0.5–1)
BETA GLOBULIN: 1.2 G/DL (ref 0.8–1.3)
ELECTROPHORESIS: ABNORMAL
GAMMA GLOBULIN: 0.6 G/DL (ref 0.7–1.6)

## 2023-01-18 ENCOUNTER — TELEPHONE (OUTPATIENT)
Dept: FAMILY MEDICINE CLINIC | Age: 73
End: 2023-01-18

## 2023-01-18 DIAGNOSIS — U07.1 COVID: Primary | ICD-10-CM

## 2023-01-18 NOTE — TELEPHONE ENCOUNTER
I sent in paxlovid. Continue with symptomatic relief. Tylenol or Advil for fever or pain. Over the counter meds as needed for cough/ congestion, like Mucinex DM. Rest. Stay hydrated. Stay home. If this worsens or remains for another 7 days without some symptomatic relief, call the office. If your symptoms rapidly decline, please go to the nearest Emergency Room.
Pharmacy told PT to not take unless she goes down on her psych meds and she cant do that.
Ambien 10 mg HS Reduce Zyprexa to 5 mg HS continue others

## 2023-01-20 NOTE — TELEPHONE ENCOUNTER
Pt was notified
She can try with only symptomatic relief and not take paxlovid
[address] called [phone] regarding the RX called in for Brooke Menon,  for Micromidas.   The pharmacist stated the patient is on Seroquel 400 mg and this will interact    Please advise as pharmacy is checking before filling     Thank You
70

## 2023-02-28 ENCOUNTER — TELEPHONE (OUTPATIENT)
Dept: FAMILY MEDICINE CLINIC | Age: 73
End: 2023-02-28

## 2023-02-28 ENCOUNTER — HOSPITAL ENCOUNTER (OUTPATIENT)
Dept: GENERAL RADIOLOGY | Age: 73
Discharge: HOME | End: 2023-03-02
Payer: MEDICARE

## 2023-02-28 ENCOUNTER — HOSPITAL ENCOUNTER (OUTPATIENT)
Age: 73
Discharge: HOME | End: 2023-03-02
Payer: MEDICARE

## 2023-02-28 ENCOUNTER — HOSPITAL ENCOUNTER (OUTPATIENT)
Age: 73
Discharge: HOME | End: 2023-02-28
Payer: MEDICARE

## 2023-02-28 DIAGNOSIS — R05.9 COUGH, UNSPECIFIED TYPE: ICD-10-CM

## 2023-02-28 DIAGNOSIS — R05.9 COUGH, UNSPECIFIED TYPE: Primary | ICD-10-CM

## 2023-02-28 DIAGNOSIS — M35.01 SJOGREN'S SYNDROME WITH KERATOCONJUNCTIVITIS SICCA (HCC): ICD-10-CM

## 2023-02-28 PROCEDURE — 36415 COLL VENOUS BLD VENIPUNCTURE: CPT

## 2023-02-28 PROCEDURE — 82784 ASSAY IGA/IGD/IGG/IGM EACH: CPT

## 2023-02-28 PROCEDURE — 84165 PROTEIN E-PHORESIS SERUM: CPT

## 2023-02-28 PROCEDURE — 84166 PROTEIN E-PHORESIS/URINE/CSF: CPT

## 2023-02-28 PROCEDURE — 86334 IMMUNOFIX E-PHORESIS SERUM: CPT

## 2023-02-28 PROCEDURE — 71045 X-RAY EXAM CHEST 1 VIEW: CPT

## 2023-02-28 NOTE — TELEPHONE ENCOUNTER
Pt called in asking for a chest xr. Pt states she had covid in Jan and she still has congestion in her chest. Pt also reports heaviness feeling in chest. Pt states she would like an xr instead of an appt.

## 2023-03-01 ENCOUNTER — TELEPHONE (OUTPATIENT)
Dept: FAMILY MEDICINE CLINIC | Age: 73
End: 2023-03-01

## 2023-03-01 LAB
ADDENDUM ELECTROPHORESIS URINE RANDOM: NORMAL
ALBUMIN SERPL-MCNC: 3.4 G/DL (ref 3.5–4.7)
ALPHA-1-GLOBULIN: 0.3 G/DL (ref 0.2–0.4)
ALPHA-2-GLOBULIN: 0.8 G/DL (ref 0.5–1)
BETA GLOBULIN: 1.3 G/DL (ref 0.8–1.3)
ELECTROPHORESIS: ABNORMAL
GAMMA GLOBULIN: 1.1 G/DL (ref 0.7–1.6)
IGA: 102 MG/DL (ref 70–400)
IGG: 741 MG/DL (ref 700–1600)
IGM: 43 MG/DL (ref 40–230)
IMMUNOFIXATION RESULT, SERUM: NORMAL
TOTAL PROTEIN: 7 G/DL (ref 6.4–8.3)

## 2023-03-01 NOTE — TELEPHONE ENCOUNTER
Pt called and stated Dr Mai Fox ordered labs for her and one show her urine has bacteria    She does not want him calling in anything for this she is asking her PCP to do this     I advised her I would check with Dr Anurag Berg on this since she was not ordering doctor    Thank You

## 2023-03-02 ENCOUNTER — OFFICE VISIT (OUTPATIENT)
Dept: ENDOCRINOLOGY | Age: 73
End: 2023-03-02
Payer: MEDICARE

## 2023-03-02 VITALS
WEIGHT: 165 LBS | DIASTOLIC BLOOD PRESSURE: 75 MMHG | HEIGHT: 65 IN | BODY MASS INDEX: 27.49 KG/M2 | HEART RATE: 68 BPM | OXYGEN SATURATION: 96 % | SYSTOLIC BLOOD PRESSURE: 126 MMHG

## 2023-03-02 DIAGNOSIS — E04.2 MULTINODULAR GOITER: ICD-10-CM

## 2023-03-02 DIAGNOSIS — R53.82 CHRONIC FATIGUE: ICD-10-CM

## 2023-03-02 DIAGNOSIS — E03.8 HYPOTHYROIDISM DUE TO HASHIMOTO'S THYROIDITIS: Primary | ICD-10-CM

## 2023-03-02 DIAGNOSIS — R79.89 LOW SERUM CORTISOL LEVEL: ICD-10-CM

## 2023-03-02 DIAGNOSIS — E06.3 HYPOTHYROIDISM DUE TO HASHIMOTO'S THYROIDITIS: Primary | ICD-10-CM

## 2023-03-02 PROCEDURE — 99214 OFFICE O/P EST MOD 30 MIN: CPT | Performed by: CLINICAL NURSE SPECIALIST

## 2023-03-02 PROCEDURE — 1123F ACP DISCUSS/DSCN MKR DOCD: CPT | Performed by: CLINICAL NURSE SPECIALIST

## 2023-03-02 PROCEDURE — 3078F DIAST BP <80 MM HG: CPT | Performed by: CLINICAL NURSE SPECIALIST

## 2023-03-02 PROCEDURE — 3074F SYST BP LT 130 MM HG: CPT | Performed by: CLINICAL NURSE SPECIALIST

## 2023-03-02 RX ORDER — LEVOTHYROXINE SODIUM 0.07 MG/1
TABLET ORAL
Qty: 24 TABLET | Refills: 5 | Status: SHIPPED | OUTPATIENT
Start: 2023-03-02

## 2023-03-02 NOTE — PROGRESS NOTES
[address] [place] [address] Department of Endocrinology Diabetes and Metabolism   [address] [facility] [address]   Phone: [phone]  Fax: [phone]    Date of Service: 3/2/2023  Primary Care Physician: Jalyn Parsons DO  Provider: JENNY Anne      Reason for the visit:  Primary Hypothyroidism    History of Present Illness: The history is provided by the patient. No  was used. Accuracy of the patient data is excellent. Diana Florentino is a very pleasant 74 y.o. female seen today for management of hypothyroidism     The patient was diagnosed with hypothyroidism 5 years ago and since then has been on thyroid hormones replacement. The patient is currently on 75 mcg daily 1 tab 5 days a week and none on sautruday and Sunday     Patient denied any history of  swelling in the area of the thyroid gland, weight loss, change in appetite, nervousness, anxiety, irritability, tremor, sweating, heat intolerance, changes in bowel habits, muscle weakness or difficulty sleeping.     Lab Results   Component Value Date    TSH 3.720 01/09/2023    FT3 2.5 02/24/2022    T4FREE 1.05 03/28/2022           PAST MEDICAL HISTORY   Past Medical History:   Diagnosis Date    Adverse effect of female hormone replacement therapy     Dr. Alicia Dick    Bipolar disorder [facility])     Mixed, has required hospitalization in the past Dr. Mayela Falk    Depression     GERD (gastroesophageal reflux disease)     GERD Management per Dr. Juan Pablo Santos    Hyperlipidemia     Management per [facility], clinical Cardiology, Dr. Brii Ramirez    Hypertension     Management per [facility], clinical Cardiology    Hypothyroidism     with nodule - Managed by Dr. Raymonde Sever    Sjogren's syndrome with keratoconjunctivitis sicca ([facility] [address]     Thyroid disease        PAST SURGICAL HISTORY   Past Surgical History:   Procedure Laterality Date    HYSTERECTOMY (CERVIX STATUS UNKNOWN)      HYSTERECTOMY, TOTAL ABDOMINAL (CERVIX REMOVED)  1979    remote    UPPER GASTROINTESTINAL ENDOSCOPY  02/23/2018    Dr. Loa Snellen HISTORY   Tobacco:   reports that she has never smoked. She has never used smokeless tobacco.  Alcohol:   reports current alcohol use. Drugs:   reports no history of drug use. FAMILY HISTORY   Family History   Problem Relation Age of Onset    Heart Disease Paternal Grandmother     Heart Disease Father         Aortic aneurysm (both his sisters and his mother as well)    Heart Disease Mother     No Known Problems Daughter     Prostate Cancer Son        ALLERGIES AND DRUG REACTIONS   Allergies   Allergen Reactions    Codeine Hives and Shortness Of Breath    Ampicillin Hives     SOB    Meperidine      reaction with Parkinson's meds.     Meperidine Hcl     Morphine Other (See Comments)     \"psychotic\"    Nitrofurantoin Hives     SOB    Penicillin G     Penicillins Hives     SOB    Sulfa Antibiotics Hives     SOB    Vancomycin Hives     SOB       CURRENT MEDICATIONS   Current Outpatient Medications   Medication Sig Dispense Refill    levothyroxine (SYNTHROID) 75 MCG tablet Take 1 tablet every day Mon-Saturday , none on Sunday 24 tablet 5    progesterone (PROMETRIUM) 200 MG CAPS capsule Take 1 capsule by mouth nightly 90 capsule 3    estradiol (VIVELLE) 0.1 MG/24HR APPLY 1 PATCH TO SKIN TWICE A WEEK 24 patch 3    lansoprazole (PREVACID) 15 MG delayed release capsule Take 15 mg by mouth daily      RESTASIS 0.05 % ophthalmic emulsion INSTILL ONE DROP INTO BOTH EYES TWICE A DAY      enalapril (VASOTEC) 10 MG tablet TAKE ONE TABLET BY MOUTH DAILY 90 tablet 1    atorvastatin (LIPITOR) 80 MG tablet TAKE ONE TABLET BY MOUTH DAILY 90 tablet 0    atenolol (TENORMIN) 50 MG tablet Take 1 tablet by mouth daily 90 tablet 0    ezetimibe (ZETIA) 10 MG tablet Take 1 tablet by mouth daily 90 tablet 0    QUEtiapine (SEROQUEL XR) 50 MG extended release tablet Take 50 mg by mouth every morning       Handicap Placard MISC by Does not apply route      Cholecalciferol (VITAMIN D3) 1000 units CAPS Take by mouth daily Take 1 by mouth daily      Omega-3 Fatty Acids (FISH OIL) 1200 MG CAPS Take by mouth daily Take 2 by mouth everyday      KOREAN GINSENG PO Take by mouth as needed       Multiple Vitamins-Minerals (CENTRUM CARDIO) TABS Take 1 tablet by mouth daily       DHA-EPA-VITAMIN E PO Take 4 capsules by mouth Mon- Wed- Friday      Coenzyme Q-10 200 MG CAPS Take 800 mg by mouth daily       Ascorbic Acid (VITAMIN C) 100 MG tablet Take 100 mg by mouth Twice weekly      methylcellulose 500 MG TABS 1 qd      Ergocalciferol (VITAMIN D2 PO) Take 400 Units by mouth daily       ALPRAZolam (XANAX) 0.5 MG tablet Take 0.5 mg by mouth as needed. Inez Saab QUEtiapine (SEROQUEL XR) 200 MG extended release tablet Take 200 mg by mouth nightly       venlafaxine (EFFEXOR XR) 75 MG extended release capsule Take 75 mg by mouth daily        No current facility-administered medications for this visit. Review of Systems  Constitutional: No fever, no chills, no diaphoresis, no generalized weakness. HEENT: No blurred vision, No sore throat, no ear pain, no hair loss  Neck: denied any neck swelling, difficulty swallowing,   Cadrdiopulomary: No CP, SOB or palpitation, No orthopnea or PND. No cough or wheezing. GI: No N/V/D, no constipation, No abdominal pain, no melena or hematochezia   : Denied any dysuria, hematuria, flank pain, discharge, or incontinence. Skin: denied any rash, ulcer, Hirsute, or hyperpigmentation. MSK: denied any joint deformity, joint pain/swelling, muscle pain, or back pain.   Neuro: no numbess, no tingling, no weakness,     OBJECTIVE    /75   Pulse 68   Ht 5' 4.5\" (1.638 m)   Wt 165 lb (74.8 kg)   SpO2 96%   BMI 27.88 kg/m²   BP Readings from Last 4 Encounters:   03/02/23 126/75   02/02/23 120/74   12/06/22 98/63   09/20/22 103/62     Wt Readings from Last 6 Encounters:   03/02/23 165 lb (74.8 kg)   02/02/23 163 lb 9.6 oz (74.2 kg)   12/06/22 157 lb 12.8 oz (71.6 kg)   09/20/22 154 lb (69.9 kg)   09/15/22 154 lb (69.9 kg)   08/25/22 157 lb (71.2 kg)       Physical examination:  General: awake alert, oriented x3, no abnormal position or movements. HEENT: normocephalic non traumatic  Neck: supple, no LN enlargement, no thyromegaly, no thyroid tenderness, no JVD. Pulm: Clear equal air entry no added sounds, no wheezing or rhonchi    CVS: S1 + S2, no murmur, no heave. Dorsalis pedis pulse palpable   Abd: soft lax, no tenderness, no organomegaly, audible bowel sounds. Skin: warm, no lesions, no rash.   Musculoskeletal: No back tenderness, no kyphosis/scoliosis   Neuro: CN intact, sensation normal , muscle power normal.  Psych: normal mood, and affect    Review of Laboratory Data:  I have reviewed the following:  Lab Results   Component Value Date/Time    WBC 4.0 (L) 01/09/2023 07:30 AM    RBC 4.86 01/09/2023 07:30 AM    HGB 14.9 01/09/2023 07:30 AM    HCT 43.7 01/09/2023 07:30 AM    MCV 89.9 01/09/2023 07:30 AM    MCH 30.7 01/09/2023 07:30 AM    MCHC 34.1 01/09/2023 07:30 AM    RDW 13.9 01/09/2023 07:30 AM     01/09/2023 07:30 AM    MPV 9.4 01/09/2023 07:30 AM      Lab Results   Component Value Date/Time     01/09/2023 07:30 AM    K 3.7 01/09/2023 07:30 AM    CO2 24 01/09/2023 07:30 AM    BUN 14 01/09/2023 07:30 AM    CREATININE 0.6 01/09/2023 07:30 AM    CALCIUM 9.1 01/09/2023 07:30 AM    LABGLOM >60 01/09/2023 07:30 AM    GFRAA >60 09/12/2022 07:11 AM      Lab Results   Component Value Date/Time    TSH 3.720 01/09/2023 07:30 AM    T4FREE 1.05 03/28/2022 08:04 AM    FT3 2.5 02/24/2022 08:04 AM    FT3 2.4 09/01/2021 10:45 AM    FT3 2.1 10/12/2020 02:35 PM    FT3 2.3 04/23/2020 09:41 AM     Lab Results   Component Value Date/Time    LABA1C 6.0 07/26/2022 06:30 AM    GLUCOSE 116 01/09/2023 07:30 AM     Lab Results   Component Value Date/Time    TRIG 112 09/12/2022 07:11 AM    HDL 55 09/12/2022 07:11 AM    LDLCALC 77 09/12/2022 07:11 AM    CHOL 154 09/12/2022 07:11 AM     Lab Results   Component Value Date/Time    VITD25 40 02/24/2022 08:04 AM    VITD25 40 09/01/2021 10:45 AM       Hai Alexander, a 74 y.o.-old female seen in for following issues     Primary hypothyroidism   Last TSH upper limit of normal.    Patient complaining of severe fatigue  Will increase levothyroxine to 75 mcg 1 tablet Monday through Saturday, none on Sunday  Recheck TFTs in 6 weeks  Was given of brand Synthroid to see if this helps improve fatigue  Told on symptoms of hypothyroidism. Patient verbalized understanding    Low estrogen in female   Pt s/p oophorectomy long time ago  Hormonal therapy per OB service   Discussed side effects of hormonal therapy     Chronic fatigue   Cosyntropin stim test was negative ruling out adrenal insufficiency    vitD deficiency   Continue vitD supplement     Multinodular goiter  I have reviewed thyroid ultrasound  Multiple nodules bilaterally unchanged from previous  We will continue to observe    I personally spent greater than 30 minutes reviewing external notes from PCP and other patient's care team providers, and personally interpreted labs associated with the above diagnosis. I also ordered labs to further assess and manage the above addressed medical conditions. Return in about 6 months (around 9/2/2023). The above issues were reviewed with the patient who understood and agreed with the plan. Thank you for allowing us to participate in the care of this patient. Please do not hesitate to contact us with any additional questions. Diagnosis Orders   1.  Hypothyroidism due to Hashimoto's thyroiditis  levothyroxine (SYNTHROID) 75 MCG tablet    T4, Free    TSH        Kwame Whitaker, APRN - CNS    [facility] Diabetes Care and Endocrinology   [address] [facility] [address]   Phone: [phone]  Fax: [phone]  ------------------------------  An electronic signature was used to authenticate this note.  JENNY Avitia - CNS   on 3/2/2023 at 10:19 AM

## 2023-03-21 ENCOUNTER — TELEPHONE (OUTPATIENT)
Dept: FAMILY MEDICINE CLINIC | Age: 73
End: 2023-03-21

## 2023-03-21 DIAGNOSIS — M35.00 SJOGREN'S SYNDROME, WITH UNSPECIFIED ORGAN INVOLVEMENT (HCC): Primary | ICD-10-CM

## 2023-03-21 NOTE — TELEPHONE ENCOUNTER
Pt called and is asking that a referral be placed to Marcial Hendrix she stated this is regarding Sjrogrens    Patient would like call when placed

## 2023-03-29 ENCOUNTER — TELEPHONE (OUTPATIENT)
Dept: FAMILY MEDICINE CLINIC | Age: 73
End: 2023-03-29

## 2023-04-27 DIAGNOSIS — N89.8 VAGINAL ITCHING: ICD-10-CM

## 2023-04-27 DIAGNOSIS — N89.8 VAGINAL IRRITATION: ICD-10-CM

## 2023-04-27 PROCEDURE — 81003 URINALYSIS AUTO W/O SCOPE: CPT | Performed by: NURSE PRACTITIONER

## 2023-05-11 ENCOUNTER — OFFICE VISIT (OUTPATIENT)
Dept: FAMILY MEDICINE CLINIC | Age: 73
End: 2023-05-11
Payer: MEDICARE

## 2023-05-11 VITALS
TEMPERATURE: 97.3 F | HEART RATE: 75 BPM | RESPIRATION RATE: 16 BRPM | BODY MASS INDEX: 27.02 KG/M2 | HEIGHT: 65 IN | SYSTOLIC BLOOD PRESSURE: 142 MMHG | OXYGEN SATURATION: 98 % | WEIGHT: 162.2 LBS | DIASTOLIC BLOOD PRESSURE: 70 MMHG

## 2023-05-11 DIAGNOSIS — N76.1 CHRONIC VAGINITIS: Primary | ICD-10-CM

## 2023-05-11 PROCEDURE — 3078F DIAST BP <80 MM HG: CPT | Performed by: FAMILY MEDICINE

## 2023-05-11 PROCEDURE — 99214 OFFICE O/P EST MOD 30 MIN: CPT | Performed by: FAMILY MEDICINE

## 2023-05-11 PROCEDURE — 3074F SYST BP LT 130 MM HG: CPT | Performed by: FAMILY MEDICINE

## 2023-05-11 PROCEDURE — 1123F ACP DISCUSS/DSCN MKR DOCD: CPT | Performed by: FAMILY MEDICINE

## 2023-05-11 RX ORDER — CIPROFLOXACIN 500 MG/1
500 TABLET, FILM COATED ORAL 2 TIMES DAILY
Qty: 20 TABLET | Refills: 0 | Status: SHIPPED | OUTPATIENT
Start: 2023-05-11 | End: 2023-05-21

## 2023-05-11 RX ORDER — FLUCONAZOLE 150 MG/1
150 TABLET ORAL DAILY
Qty: 3 TABLET | Refills: 2 | Status: SHIPPED | OUTPATIENT
Start: 2023-05-11

## 2023-06-23 DIAGNOSIS — E03.9 ACQUIRED HYPOTHYROIDISM: ICD-10-CM

## 2023-06-23 DIAGNOSIS — I10 ESSENTIAL HYPERTENSION: ICD-10-CM

## 2023-06-23 DIAGNOSIS — E55.9 VITAMIN D DEFICIENCY: ICD-10-CM

## 2023-06-23 DIAGNOSIS — R73.01 IMPAIRED FASTING GLUCOSE: ICD-10-CM

## 2023-06-23 DIAGNOSIS — E78.2 MIXED HYPERLIPIDEMIA: Primary | ICD-10-CM

## 2023-06-28 ENCOUNTER — HOSPITAL ENCOUNTER (OUTPATIENT)
Dept: GENERAL RADIOLOGY | Age: 73
Discharge: HOME | End: 2023-06-30
Payer: MEDICARE

## 2023-06-28 DIAGNOSIS — Z12.31 SCREENING MAMMOGRAM FOR BREAST CANCER: ICD-10-CM

## 2023-06-28 DIAGNOSIS — Z78.0 POSTMENOPAUSAL ESTROGEN DEFICIENCY: ICD-10-CM

## 2023-06-28 PROCEDURE — 77080 DXA BONE DENSITY AXIAL: CPT

## 2023-06-28 PROCEDURE — 77067 SCR MAMMO BI INCL CAD: CPT

## 2023-06-29 ENCOUNTER — TELEPHONE (OUTPATIENT)
Dept: GENERAL RADIOLOGY | Age: 73
End: 2023-06-29

## 2023-06-29 DIAGNOSIS — R92.8 ABNORMAL MAMMOGRAM: Primary | ICD-10-CM

## 2023-07-05 ENCOUNTER — HOSPITAL ENCOUNTER (OUTPATIENT)
Dept: GENERAL RADIOLOGY | Age: 73
Discharge: HOME | End: 2023-07-07
Payer: MEDICARE

## 2023-07-05 DIAGNOSIS — R92.8 ABNORMAL MAMMOGRAM: ICD-10-CM

## 2023-07-05 PROCEDURE — 77065 DX MAMMO INCL CAD UNI: CPT

## 2023-07-05 PROCEDURE — G0279 TOMOSYNTHESIS, MAMMO: HCPCS

## 2023-07-06 ENCOUNTER — OFFICE VISIT (OUTPATIENT)
Dept: ENDOCRINOLOGY | Age: 73
End: 2023-07-06
Payer: MEDICARE

## 2023-07-06 VITALS
HEIGHT: 65 IN | WEIGHT: 162 LBS | OXYGEN SATURATION: 99 % | DIASTOLIC BLOOD PRESSURE: 72 MMHG | RESPIRATION RATE: 18 BRPM | HEART RATE: 72 BPM | SYSTOLIC BLOOD PRESSURE: 114 MMHG | BODY MASS INDEX: 26.99 KG/M2

## 2023-07-06 DIAGNOSIS — E03.8 HYPOTHYROIDISM DUE TO HASHIMOTO'S THYROIDITIS: Primary | ICD-10-CM

## 2023-07-06 DIAGNOSIS — E04.2 MULTINODULAR GOITER: ICD-10-CM

## 2023-07-06 DIAGNOSIS — E06.3 HYPOTHYROIDISM DUE TO HASHIMOTO'S THYROIDITIS: Primary | ICD-10-CM

## 2023-07-06 LAB
AVERAGE GLUCOSE: NORMAL
HBA1C MFR BLD: 5.8 %

## 2023-07-06 PROCEDURE — 1123F ACP DISCUSS/DSCN MKR DOCD: CPT | Performed by: INTERNAL MEDICINE

## 2023-07-06 PROCEDURE — 3078F DIAST BP <80 MM HG: CPT | Performed by: INTERNAL MEDICINE

## 2023-07-06 PROCEDURE — 3074F SYST BP LT 130 MM HG: CPT | Performed by: INTERNAL MEDICINE

## 2023-07-06 PROCEDURE — 99214 OFFICE O/P EST MOD 30 MIN: CPT | Performed by: INTERNAL MEDICINE

## 2023-07-06 RX ORDER — LEVOTHYROXINE SODIUM 0.07 MG/1
TABLET ORAL
Qty: 90 TABLET | Refills: 3 | Status: SHIPPED | OUTPATIENT
Start: 2023-07-06

## 2023-07-06 NOTE — PROGRESS NOTES
[address] [facility] Department of Endocrinology Diabetes and Metabolism   [facility] [facility] [address]   Phone: [phone]  Fax: [phone]    Date of Service: 7/6/2023  Primary Care Physician: Gaby Walker DO  Provider: Camellia Bosworth MD        Reason for the visit:  Primary Hypothyroidism    History of Present Illness: The history is provided by the patient. No  was used. Accuracy of the patient data is excellent. Roberta Urbina is a very pleasant 74 y.o. female seen today for management of hypothyroidism     The patient was diagnosed with hypothyroidism 5 years ago and since then has been on thyroid hormones replacement. The patient is currently on 75 mcg daily 1 tab 6 days a week and none on Sunday     Patient denied any history of  swelling in the area of the thyroid gland, weight loss, change in appetite, nervousness, anxiety, irritability, tremor, sweating, heat intolerance, changes in bowel habits, muscle weakness or difficulty sleeping.     Fasting BG was 100     PAST MEDICAL HISTORY   Past Medical History:   Diagnosis Date    Adverse effect of female hormone replacement therapy     Dr. Danielle Hammond    Bipolar disorder [facility])     Mixed, has required hospitalization in the past Dr. Galileo Riley    Depression     GERD (gastroesophageal reflux disease)     GERD Management per Dr. Elena Mai    Hyperlipidemia     Management per [facility], clinical Cardiology, Dr. Mike Prather    Hypertension     Management per [facility], clinical Cardiology    Hypothyroidism     with nodule - Managed by Dr. Mari Pate    Sjogren's syndrome with keratoconjunctivitis sicca [facility])     Thyroid disease        PAST SURGICAL HISTORY   Past Surgical History:   Procedure Laterality Date    HYSTERECTOMY (CERVIX STATUS UNKNOWN)      HYSTERECTOMY, TOTAL ABDOMINAL (CERVIX REMOVED)  1979    remote    UPPER GASTROINTESTINAL ENDOSCOPY  02/23/2018    Dr. Corby Rogers

## 2023-07-10 ENCOUNTER — HOSPITAL ENCOUNTER (OUTPATIENT)
Dept: CT IMAGING | Age: 73
Discharge: HOME | End: 2023-07-12
Payer: MEDICARE

## 2023-07-10 VITALS
OXYGEN SATURATION: 100 % | HEIGHT: 65 IN | DIASTOLIC BLOOD PRESSURE: 70 MMHG | SYSTOLIC BLOOD PRESSURE: 134 MMHG | BODY MASS INDEX: 27.32 KG/M2 | HEART RATE: 52 BPM | TEMPERATURE: 97.9 F | WEIGHT: 164 LBS | RESPIRATION RATE: 18 BRPM

## 2023-07-10 DIAGNOSIS — I20.9 ANGINAL SYNDROME (HCC): ICD-10-CM

## 2023-07-10 LAB
ALBUMIN SERPL-MCNC: 4.7 G/DL (ref 3.5–5.2)
ALP SERPL-CCNC: 57 U/L (ref 35–104)
ALT SERPL-CCNC: 33 U/L (ref 0–32)
ANION GAP SERPL CALCULATED.3IONS-SCNC: 9 MMOL/L (ref 7–16)
AST SERPL-CCNC: 37 U/L (ref 0–31)
BILIRUB SERPL-MCNC: 0.5 MG/DL (ref 0–1.2)
BUN SERPL-MCNC: 13 MG/DL (ref 6–23)
CALCIUM SERPL-MCNC: 9.5 MG/DL (ref 8.6–10.2)
CHLORIDE SERPL-SCNC: 106 MMOL/L (ref 98–107)
CO2 SERPL-SCNC: 25 MMOL/L (ref 22–29)
CREAT SERPL-MCNC: 0.7 MG/DL (ref 0.5–1)
GLUCOSE SERPL-MCNC: 113 MG/DL (ref 74–99)
POTASSIUM SERPL-SCNC: 4.6 MMOL/L (ref 3.5–5)
PROT SERPL-MCNC: 7.1 G/DL (ref 6.4–8.3)
SODIUM SERPL-SCNC: 140 MMOL/L (ref 132–146)

## 2023-07-10 PROCEDURE — 2580000003 HC RX 258: Performed by: INTERNAL MEDICINE

## 2023-07-10 PROCEDURE — 6370000000 HC RX 637 (ALT 250 FOR IP): Performed by: INTERNAL MEDICINE

## 2023-07-10 PROCEDURE — 36415 COLL VENOUS BLD VENIPUNCTURE: CPT

## 2023-07-10 PROCEDURE — 75574 CT ANGIO HRT W/3D IMAGE: CPT | Performed by: INTERNAL MEDICINE

## 2023-07-10 PROCEDURE — 6360000004 HC RX CONTRAST MEDICATION: Performed by: INTERNAL MEDICINE

## 2023-07-10 PROCEDURE — 75574 CT ANGIO HRT W/3D IMAGE: CPT

## 2023-07-10 PROCEDURE — 7100000011 HC PHASE II RECOVERY - ADDTL 15 MIN

## 2023-07-10 PROCEDURE — 2500000003 HC RX 250 WO HCPCS: Performed by: INTERNAL MEDICINE

## 2023-07-10 PROCEDURE — 7100000010 HC PHASE II RECOVERY - FIRST 15 MIN

## 2023-07-10 PROCEDURE — 80053 COMPREHEN METABOLIC PANEL: CPT

## 2023-07-10 RX ORDER — METOPROLOL TARTRATE 50 MG/1
25 TABLET, FILM COATED ORAL ONCE
Status: COMPLETED | OUTPATIENT
Start: 2023-07-10 | End: 2023-07-10

## 2023-07-10 RX ORDER — NITROGLYCERIN 0.4 MG/1
0.4 TABLET SUBLINGUAL EVERY 5 MIN PRN
Status: DISCONTINUED | OUTPATIENT
Start: 2023-07-10 | End: 2023-07-10

## 2023-07-10 RX ORDER — METOPROLOL TARTRATE 5 MG/5ML
5 INJECTION INTRAVENOUS ONCE
Status: COMPLETED | OUTPATIENT
Start: 2023-07-10 | End: 2023-07-10

## 2023-07-10 RX ORDER — NITROGLYCERIN 0.4 MG/1
0.8 TABLET SUBLINGUAL EVERY 5 MIN PRN
Status: DISCONTINUED | OUTPATIENT
Start: 2023-07-10 | End: 2023-07-13 | Stop reason: HOSPADM

## 2023-07-10 RX ORDER — SODIUM CHLORIDE 0.9 % (FLUSH) 0.9 %
10 SYRINGE (ML) INJECTION ONCE
Status: COMPLETED | OUTPATIENT
Start: 2023-07-10 | End: 2023-07-10

## 2023-07-10 RX ADMIN — Medication 10 ML: at 09:15

## 2023-07-10 RX ADMIN — NITROGLYCERIN 0.8 MG: 0.4 TABLET SUBLINGUAL at 09:42

## 2023-07-10 RX ADMIN — IOPAMIDOL 60 ML: 755 INJECTION, SOLUTION INTRAVENOUS at 09:15

## 2023-07-10 RX ADMIN — METOPROLOL TARTRATE 25 MG: 50 TABLET ORAL at 08:58

## 2023-07-10 RX ADMIN — METOPROLOL TARTRATE 5 MG: 5 INJECTION INTRAVENOUS at 09:35

## 2023-07-10 ASSESSMENT — PAIN - FUNCTIONAL ASSESSMENT
PAIN_FUNCTIONAL_ASSESSMENT: NONE - DENIES PAIN
PAIN_FUNCTIONAL_ASSESSMENT: NONE - DENIES PAIN

## 2023-07-10 NOTE — PROGRESS NOTES
0930 Pt. anxious,heart rate 60-70. Arlin notified,orders obtained. 0935 Lopressor 5mg iv given as ordered. 0940 Bp 148/65 HR 65. Pt.calming with emotional support. Wants to continue with test.  0942 NTG 0.8 SL given. 0945 Vss. Test being done,pt. calm. 4330 Test complete,returned to angio holding. Vss. Taking water well. 1040 Given discharge instructions,verbalized understanding of. Discharged ambulatory.

## 2023-07-31 ENCOUNTER — TELEPHONE (OUTPATIENT)
Dept: FAMILY MEDICINE CLINIC | Age: 73
End: 2023-07-31

## 2023-07-31 NOTE — TELEPHONE ENCOUNTER
Pt was wanting to know if someone could send over labs for her cholesterol for LDL and HDL. Please Advise.

## 2023-08-01 ENCOUNTER — HOSPITAL ENCOUNTER (OUTPATIENT)
Dept: ULTRASOUND IMAGING | Age: 73
Discharge: HOME | End: 2023-08-03
Payer: MEDICARE

## 2023-08-01 ENCOUNTER — HOSPITAL ENCOUNTER (OUTPATIENT)
Age: 73
Discharge: HOME | End: 2023-08-01
Payer: MEDICARE

## 2023-08-01 ENCOUNTER — HOSPITAL ENCOUNTER (OUTPATIENT)
Dept: ULTRASOUND IMAGING | Age: 73
Discharge: HOME | End: 2023-08-03
Attending: INTERNAL MEDICINE
Payer: MEDICARE

## 2023-08-01 DIAGNOSIS — E03.8 HYPOTHYROIDISM DUE TO HASHIMOTO'S THYROIDITIS: ICD-10-CM

## 2023-08-01 DIAGNOSIS — I65.21 OCCLUSION OF RIGHT CAROTID ARTERY: ICD-10-CM

## 2023-08-01 DIAGNOSIS — I72.9 ANEURYSM OF UNSPECIFIED SITE (HCC): ICD-10-CM

## 2023-08-01 DIAGNOSIS — E06.3 HYPOTHYROIDISM DUE TO HASHIMOTO'S THYROIDITIS: ICD-10-CM

## 2023-08-01 DIAGNOSIS — E04.2 MULTINODULAR GOITER: ICD-10-CM

## 2023-08-01 LAB
ALBUMIN SERPL-MCNC: 4.8 G/DL (ref 3.5–5.2)
ALP SERPL-CCNC: 56 U/L (ref 35–104)
ALT SERPL-CCNC: 31 U/L (ref 0–32)
ANION GAP SERPL CALCULATED.3IONS-SCNC: 10 MMOL/L (ref 7–16)
AST SERPL-CCNC: 35 U/L (ref 0–31)
BILIRUB SERPL-MCNC: 0.5 MG/DL (ref 0–1.2)
BUN SERPL-MCNC: 12 MG/DL (ref 6–23)
CALCIUM SERPL-MCNC: 9.7 MG/DL (ref 8.6–10.2)
CHLORIDE SERPL-SCNC: 104 MMOL/L (ref 98–107)
CO2 SERPL-SCNC: 25 MMOL/L (ref 22–29)
CREAT SERPL-MCNC: 0.6 MG/DL (ref 0.5–1)
GFR SERPL CREATININE-BSD FRML MDRD: >60 ML/MIN/1.73M2
GLUCOSE SERPL-MCNC: 120 MG/DL (ref 74–99)
POTASSIUM SERPL-SCNC: 4.9 MMOL/L (ref 3.5–5)
PROT SERPL-MCNC: 6.9 G/DL (ref 6.4–8.3)
SODIUM SERPL-SCNC: 139 MMOL/L (ref 132–146)
T4 FREE SERPL-MCNC: 1.3 NG/DL (ref 0.9–1.7)
TSH SERPL DL<=0.05 MIU/L-ACNC: 0.91 UIU/ML (ref 0.27–4.2)

## 2023-08-01 PROCEDURE — 84439 ASSAY OF FREE THYROXINE: CPT

## 2023-08-01 PROCEDURE — 76536 US EXAM OF HEAD AND NECK: CPT

## 2023-08-01 PROCEDURE — 36415 COLL VENOUS BLD VENIPUNCTURE: CPT

## 2023-08-01 PROCEDURE — 76775 US EXAM ABDO BACK WALL LIM: CPT

## 2023-08-01 PROCEDURE — 80053 COMPREHEN METABOLIC PANEL: CPT

## 2023-08-01 PROCEDURE — 93880 EXTRACRANIAL BILAT STUDY: CPT

## 2023-08-01 PROCEDURE — 84443 ASSAY THYROID STIM HORMONE: CPT

## 2023-08-03 ENCOUNTER — TELEPHONE (OUTPATIENT)
Dept: ENDOCRINOLOGY | Age: 73
End: 2023-08-03

## 2023-08-03 NOTE — TELEPHONE ENCOUNTER
Notify patient  Sofia Rdedy, thyroid hormones are within normal limit. We will continue current dose of thyroid medication.

## 2023-08-04 ENCOUNTER — TELEPHONE (OUTPATIENT)
Dept: ENDOCRINOLOGY | Age: 73
End: 2023-08-04

## 2023-08-04 DIAGNOSIS — E04.2 MULTINODULAR GOITER: Primary | ICD-10-CM

## 2023-08-04 NOTE — TELEPHONE ENCOUNTER
Notify patient  Thyroid ultrasound showed multiple thyroid nodules. Your thyroid nodules still does not meet criteria for biopsy. We will need to continue monitoring the thyroid nodules on ultrasound. I recommend repeat the ultrasound before next office visit in a year.

## 2023-08-30 ENCOUNTER — OFFICE VISIT (OUTPATIENT)
Dept: FAMILY MEDICINE CLINIC | Age: 73
End: 2023-08-30
Payer: MEDICARE

## 2023-08-30 VITALS
SYSTOLIC BLOOD PRESSURE: 126 MMHG | OXYGEN SATURATION: 95 % | DIASTOLIC BLOOD PRESSURE: 79 MMHG | HEART RATE: 64 BPM | BODY MASS INDEX: 26.88 KG/M2 | TEMPERATURE: 97.3 F | HEIGHT: 66 IN | RESPIRATION RATE: 18 BRPM

## 2023-08-30 DIAGNOSIS — Z00.00 MEDICARE ANNUAL WELLNESS VISIT, SUBSEQUENT: Primary | ICD-10-CM

## 2023-08-30 DIAGNOSIS — J40 SINOBRONCHITIS: ICD-10-CM

## 2023-08-30 DIAGNOSIS — J32.9 SINOBRONCHITIS: ICD-10-CM

## 2023-08-30 PROCEDURE — 3078F DIAST BP <80 MM HG: CPT | Performed by: FAMILY MEDICINE

## 2023-08-30 PROCEDURE — 3074F SYST BP LT 130 MM HG: CPT | Performed by: FAMILY MEDICINE

## 2023-08-30 PROCEDURE — 1123F ACP DISCUSS/DSCN MKR DOCD: CPT | Performed by: FAMILY MEDICINE

## 2023-08-30 PROCEDURE — G0439 PPPS, SUBSEQ VISIT: HCPCS | Performed by: FAMILY MEDICINE

## 2023-08-30 RX ORDER — AZITHROMYCIN 250 MG/1
250 TABLET, FILM COATED ORAL SEE ADMIN INSTRUCTIONS
Qty: 6 TABLET | Refills: 0 | Status: SHIPPED | OUTPATIENT
Start: 2023-08-30 | End: 2023-09-04

## 2023-08-30 RX ORDER — VALACYCLOVIR HYDROCHLORIDE 1 G/1
1000 TABLET, FILM COATED ORAL 3 TIMES DAILY
Qty: 21 TABLET | Refills: 3 | Status: SHIPPED | OUTPATIENT
Start: 2023-08-30 | End: 2023-09-06

## 2023-08-30 RX ORDER — ACYCLOVIR 50 MG/G
OINTMENT TOPICAL
Qty: 30 G | Refills: 5 | Status: SHIPPED | OUTPATIENT
Start: 2023-08-30 | End: 2023-09-06

## 2023-08-30 ASSESSMENT — PATIENT HEALTH QUESTIONNAIRE - PHQ9
SUM OF ALL RESPONSES TO PHQ QUESTIONS 1-9: 2
SUM OF ALL RESPONSES TO PHQ QUESTIONS 1-9: 2
2. FEELING DOWN, DEPRESSED OR HOPELESS: 1
1. LITTLE INTEREST OR PLEASURE IN DOING THINGS: 1
SUM OF ALL RESPONSES TO PHQ QUESTIONS 1-9: 2
SUM OF ALL RESPONSES TO PHQ QUESTIONS 1-9: 2
SUM OF ALL RESPONSES TO PHQ9 QUESTIONS 1 & 2: 2

## 2023-08-30 ASSESSMENT — LIFESTYLE VARIABLES
HOW MANY STANDARD DRINKS CONTAINING ALCOHOL DO YOU HAVE ON A TYPICAL DAY: 1 OR 2
HOW OFTEN DO YOU HAVE A DRINK CONTAINING ALCOHOL: 2-3 TIMES A WEEK

## 2023-08-30 NOTE — PATIENT INSTRUCTIONS
in your family, and various assessments and screenings as appropriate. After reviewing your medical record and screening and assessments performed today your provider may have ordered immunizations, labs, imaging, and/or referrals for you. A list of these orders (if applicable) as well as your Preventive Care list are included within your After Visit Summary for your review. Other Preventive Recommendations:    A preventive eye exam performed by an eye specialist is recommended every 1-2 years to screen for glaucoma; cataracts, macular degeneration, and other eye disorders. A preventive dental visit is recommended every 6 months. Try to get at least 150 minutes of exercise per week or 10,000 steps per day on a pedometer . Order or download the FREE \"Exercise & Physical Activity: Your Everyday Guide\" from [employer]. Call 3-975.291.2696 or search [employer] online. You need 5978-5661 mg of calcium and 1409-6215 IU of vitamin D per day. It is possible to meet your calcium requirement with diet alone, but a vitamin D supplement is usually necessary to meet this goal.  When exposed to the sun, use a sunscreen that protects against both UVA and UVB radiation with an SPF of 30 or greater. Reapply every 2 to 3 hours or after sweating, drying off with a towel, or swimming. Always wear a seat belt when traveling in a car. Always wear a helmet when riding a bicycle or motorcycle.

## 2023-10-02 ENCOUNTER — OFFICE VISIT (OUTPATIENT)
Dept: RHEUMATOLOGY | Age: 73
End: 2023-10-02
Payer: COMMERCIAL

## 2023-10-02 VITALS — WEIGHT: 164 LBS | BODY MASS INDEX: 27.32 KG/M2 | HEIGHT: 65 IN

## 2023-10-02 DIAGNOSIS — M35.05 SJOGREN'S SYNDROME WITH INFLAMMATORY ARTHRITIS (HCC): Primary | ICD-10-CM

## 2023-10-02 DIAGNOSIS — E78.2 MIXED HYPERLIPIDEMIA: ICD-10-CM

## 2023-10-02 DIAGNOSIS — M15.9 GENERALIZED OSTEOARTHRITIS: ICD-10-CM

## 2023-10-02 DIAGNOSIS — Z79.899 HIGH RISK MEDICATION USE: ICD-10-CM

## 2023-10-02 PROCEDURE — 99214 OFFICE O/P EST MOD 30 MIN: CPT | Performed by: INTERNAL MEDICINE

## 2023-10-02 PROCEDURE — 1123F ACP DISCUSS/DSCN MKR DOCD: CPT | Performed by: INTERNAL MEDICINE

## 2023-10-02 RX ORDER — HYDROXYCHLOROQUINE SULFATE 200 MG/1
200 TABLET, FILM COATED ORAL DAILY
Qty: 30 TABLET | Refills: 5 | Status: SHIPPED | OUTPATIENT
Start: 2023-10-02

## 2023-10-02 ASSESSMENT — ENCOUNTER SYMPTOMS
DIARRHEA: 0
NAUSEA: 0
ABDOMINAL PAIN: 0
COLOR CHANGE: 0
TROUBLE SWALLOWING: 0
VOMITING: 0

## 2023-10-02 NOTE — PROGRESS NOTES
visit.     Physical Exam  Constitutional:       General: She is not in acute distress. Appearance: Normal appearance. HENT:      Head: Normocephalic and atraumatic. Nose: Nose normal.   Eyes:      General: No scleral icterus. Pulmonary:      Effort: Pulmonary effort is normal.      Breath sounds: Normal breath sounds. Musculoskeletal:         General: Swelling, tenderness and deformity present. Comments: She has Heberden's and Belinda's nodes and squaring of the first CMC joints bilaterally but does appear to have some swelling in several PIP joints today. Skin:     General: Skin is warm and dry. Findings: No rash. Neurological:      General: No focal deficit present. Mental Status: She is alert and oriented to person, place, and time. Mental status is at baseline. Psychiatric:         Mood and Affect: Mood normal.         Behavior: Behavior normal.            Lab Results   Component Value Date    WBC 4.0 (L) 01/09/2023    HGB 14.9 01/09/2023    HCT 43.7 01/09/2023    MCV 89.9 01/09/2023     01/09/2023     Lab Results   Component Value Date     08/01/2023    K 4.9 08/01/2023     08/01/2023    CO2 25 08/01/2023    BUN 12 08/01/2023    CREATININE 0.6 08/01/2023    GLUCOSE 120 (H) 08/01/2023    CALCIUM 9.7 08/01/2023    PROT 6.9 08/01/2023    LABALBU 4.8 08/01/2023    BILITOT 0.5 08/01/2023    ALKPHOS 56 08/01/2023    AST 35 (H) 08/01/2023    ALT 31 08/01/2023    LABGLOM >60 08/01/2023    GFRAA >60 09/12/2022     Lab Results   Component Value Date    GLENNY NEGATIVE 03/28/2022     No results found for: \"RHEUMFACTOR\"  Lab Results   Component Value Date    SEDRATE 11 01/09/2023     Lab Results   Component Value Date    CRP 0.6 (H) 01/09/2023            An electronic signature was used to authenticate this note. This note was generated with a voice recognition dictation system. Please disregard any errors or omission which have escaped my review.     --Kelli Lea

## 2024-01-12 ENCOUNTER — HOSPITAL ENCOUNTER (OUTPATIENT)
Dept: PULMONOLOGY | Age: 74
Discharge: HOME | End: 2024-01-12
Attending: INTERNAL MEDICINE
Payer: MEDICARE

## 2024-01-12 DIAGNOSIS — M35.01 SJOGREN'S SYNDROME WITH KERATOCONJUNCTIVITIS SICCA (HCC): ICD-10-CM

## 2024-01-12 PROCEDURE — 94726 PLETHYSMOGRAPHY LUNG VOLUMES: CPT

## 2024-01-12 PROCEDURE — 94060 EVALUATION OF WHEEZING: CPT

## 2024-01-12 PROCEDURE — 94729 DIFFUSING CAPACITY: CPT

## 2024-01-31 ENCOUNTER — OFFICE VISIT (OUTPATIENT)
Dept: FAMILY MEDICINE CLINIC | Age: 74
End: 2024-01-31
Payer: MEDICARE

## 2024-01-31 VITALS
RESPIRATION RATE: 16 BRPM | DIASTOLIC BLOOD PRESSURE: 82 MMHG | WEIGHT: 165.8 LBS | OXYGEN SATURATION: 98 % | BODY MASS INDEX: 27.63 KG/M2 | HEART RATE: 58 BPM | SYSTOLIC BLOOD PRESSURE: 136 MMHG | TEMPERATURE: 97.5 F | HEIGHT: 65 IN

## 2024-01-31 DIAGNOSIS — Z01.818 PREOPERATIVE CLEARANCE: ICD-10-CM

## 2024-01-31 DIAGNOSIS — E78.2 MIXED HYPERLIPIDEMIA: Primary | ICD-10-CM

## 2024-01-31 DIAGNOSIS — M17.12 OSTEOARTHRITIS OF LEFT KNEE, UNSPECIFIED OSTEOARTHRITIS TYPE: ICD-10-CM

## 2024-01-31 DIAGNOSIS — I10 ESSENTIAL HYPERTENSION: ICD-10-CM

## 2024-01-31 PROCEDURE — 3075F SYST BP GE 130 - 139MM HG: CPT | Performed by: FAMILY MEDICINE

## 2024-01-31 PROCEDURE — 1123F ACP DISCUSS/DSCN MKR DOCD: CPT | Performed by: FAMILY MEDICINE

## 2024-01-31 PROCEDURE — 99214 OFFICE O/P EST MOD 30 MIN: CPT | Performed by: FAMILY MEDICINE

## 2024-01-31 PROCEDURE — 3079F DIAST BP 80-89 MM HG: CPT | Performed by: FAMILY MEDICINE

## 2024-01-31 NOTE — PROGRESS NOTES
1/31/2024    Chief Complaint   Patient presents with    Medicare AWV    Pre-op Exam     Left knee surgery, EKG scheduled with Dr. Landon MURRIETA    Arely Childers is a 73 y.o. patient that presents today for:    Planned procedure/surgeon:   Left knee arthroplasty, Dr. Giraldo    Date:        EKG:  due to see Dr. Dickson   Heart Disease: PVCs, managed by Dr. Dickson, upcoming visit  Lung Disease: yes, sjogrens, managed by Dr. Villarreal, cleared  Comorbidities:  dyslipidemia and hypertension- well controlled.        Patient's past medical, surgical, social and/or family history reviewed, updated in chart, and are non-contributory (unless otherwise stated).  Medications and allergies also reviewed and updated in chart.     ROS negative unless otherwise specified    Physical Exam  Temp Readings from Last 3 Encounters:   01/31/24 97.5 °F (36.4 °C) (Temporal)   08/30/23 97.3 °F (36.3 °C) (Temporal)   07/10/23 97.9 °F (36.6 °C) (Temporal)     Wt Readings from Last 3 Encounters:   01/31/24 75.2 kg (165 lb 12.8 oz)   01/25/24 74.4 kg (164 lb)   11/08/23 74.4 kg (164 lb)     BP Readings from Last 3 Encounters:   01/31/24 136/82   01/25/24 136/70   11/08/23 (!) 140/78     Pulse Readings from Last 3 Encounters:   01/31/24 58   01/25/24 88   08/30/23 64       General appearance: alert, well appearing, and in no distress, oriented to person, place, and time and normal appearing weight.    CVS exam: normal rate, regular rhythm, normal S1, S2, no murmurs, rubs, clicks or gallops.  Radial pulses 2+ bilateral.  PT/DP pulse 2+ bilat.  No C/C/E    Chest: clear to auscultation, no wheezes, rales or rhonchi, symmetric air entry.     Abdomen: Soft, non-tender, non-distended, positive BS in all 4 quadrants    Extremities:Dorsalis pedis pulses palpated bilaterally, no clubbing, cyanosis, edema or erythema,     SKIN: no lesions, jaundice, petechiae, pallor, cyanosis, ecchymosis    NEURO: gross motor exam normal by observation,

## 2024-02-13 ENCOUNTER — OFFICE VISIT (OUTPATIENT)
Dept: FAMILY MEDICINE CLINIC | Age: 74
End: 2024-02-13
Payer: MEDICARE

## 2024-02-13 VITALS
DIASTOLIC BLOOD PRESSURE: 64 MMHG | WEIGHT: 165 LBS | RESPIRATION RATE: 18 BRPM | OXYGEN SATURATION: 96 % | TEMPERATURE: 97.7 F | SYSTOLIC BLOOD PRESSURE: 126 MMHG | BODY MASS INDEX: 27.49 KG/M2 | HEART RATE: 64 BPM | HEIGHT: 65 IN

## 2024-02-13 DIAGNOSIS — J40 SINOBRONCHITIS: ICD-10-CM

## 2024-02-13 DIAGNOSIS — Z00.00 MEDICARE ANNUAL WELLNESS VISIT, SUBSEQUENT: Primary | ICD-10-CM

## 2024-02-13 DIAGNOSIS — N39.46 MIXED STRESS AND URGE URINARY INCONTINENCE: ICD-10-CM

## 2024-02-13 DIAGNOSIS — J32.9 SINOBRONCHITIS: ICD-10-CM

## 2024-02-13 DIAGNOSIS — Z01.818 PREOP TESTING: ICD-10-CM

## 2024-02-13 DIAGNOSIS — R73.09 ELEVATED GLUCOSE: ICD-10-CM

## 2024-02-13 PROCEDURE — G0439 PPPS, SUBSEQ VISIT: HCPCS | Performed by: FAMILY MEDICINE

## 2024-02-13 PROCEDURE — 3078F DIAST BP <80 MM HG: CPT | Performed by: FAMILY MEDICINE

## 2024-02-13 PROCEDURE — 1123F ACP DISCUSS/DSCN MKR DOCD: CPT | Performed by: FAMILY MEDICINE

## 2024-02-13 PROCEDURE — 3074F SYST BP LT 130 MM HG: CPT | Performed by: FAMILY MEDICINE

## 2024-02-13 RX ORDER — AZITHROMYCIN 250 MG/1
250 TABLET, FILM COATED ORAL SEE ADMIN INSTRUCTIONS
Qty: 6 TABLET | Refills: 0 | Status: SHIPPED | OUTPATIENT
Start: 2024-02-13 | End: 2024-02-18

## 2024-02-13 RX ORDER — OXYBUTYNIN CHLORIDE 5 MG/1
5 TABLET ORAL 3 TIMES DAILY
Qty: 90 TABLET | Refills: 0 | Status: SHIPPED | OUTPATIENT
Start: 2024-02-13

## 2024-02-13 RX ORDER — FLUCONAZOLE 150 MG/1
150 TABLET ORAL DAILY
Qty: 3 TABLET | Refills: 0 | Status: SHIPPED | OUTPATIENT
Start: 2024-02-13 | End: 2024-02-16

## 2024-02-13 ASSESSMENT — LIFESTYLE VARIABLES
HOW MANY STANDARD DRINKS CONTAINING ALCOHOL DO YOU HAVE ON A TYPICAL DAY: 1 OR 2
HOW OFTEN DO YOU HAVE A DRINK CONTAINING ALCOHOL: MONTHLY OR LESS

## 2024-02-13 ASSESSMENT — PATIENT HEALTH QUESTIONNAIRE - PHQ9
SUM OF ALL RESPONSES TO PHQ9 QUESTIONS 1 & 2: 0
SUM OF ALL RESPONSES TO PHQ QUESTIONS 1-9: 0
2. FEELING DOWN, DEPRESSED OR HOPELESS: 0
1. LITTLE INTEREST OR PLEASURE IN DOING THINGS: 0
SUM OF ALL RESPONSES TO PHQ QUESTIONS 1-9: 0

## 2024-02-13 NOTE — PROGRESS NOTES
Medicare Annual Wellness Visit    Arely Childers is here for Medicare AWV and Discuss Medications (Refused to discuss or review medications with me- states wants to speak to the doctor about those types of things. )    Assessment & Plan   Medicare annual wellness visit, subsequent  Preop testing  -     Culture, MRSA, Screening; Future  Elevated glucose  -     Hemoglobin A1C; Future  Recommendations for Preventive Services Due: see orders and patient instructions/AVS.  Recommended screening schedule for the next 5-10 years is provided to the patient in written form: see Patient Instructions/AVS.     Return for Medicare Annual Wellness Visit in 1 year.     Subjective     Urinary Incontinence: Patient complains of urinary incontinence. This has been present for several years. She leaks urine with coughing. Patient describes the symptoms as  urge to urinate with little or no warning.  Factors associated with symptoms include none known. Evaluation to date includes none.Treatment to date includes none.      Patient's complete Health Risk Assessment and screening values have been reviewed and are found in Flowsheets. The following problems were reviewed today and where indicated follow up appointments were made and/or referrals ordered.    Positive Risk Factor Screenings with Interventions:                    Safety:  Do you have non-slip mats or non-slip surfaces or shower bars or grab bars in your shower or bathtub?: (!) No  Interventions:  Patient advised to follow up in the office for further evaluation and treatment                 Objective   Vitals:    02/13/24 1334   BP: 126/64   Pulse: 64   Resp: 18   Temp: 97.7 °F (36.5 °C)   SpO2: 96%   Weight: 74.8 kg (165 lb)   Height: 1.651 m (5' 5\")      Body mass index is 27.46 kg/m².        General Appearance: alert and oriented to person, place and time, well developed and well- nourished, in no acute distress  Skin: warm and dry, no rash or erythema  Head:

## 2024-02-28 ENCOUNTER — HOSPITAL ENCOUNTER (OUTPATIENT)
Age: 74
Discharge: HOME | End: 2024-02-28
Payer: MEDICARE

## 2024-02-28 ENCOUNTER — HOSPITAL ENCOUNTER (OUTPATIENT)
Age: 74
Discharge: HOME | End: 2024-03-01

## 2024-02-28 DIAGNOSIS — R73.09 ELEVATED GLUCOSE: ICD-10-CM

## 2024-02-28 DIAGNOSIS — E78.2 MIXED HYPERLIPIDEMIA: ICD-10-CM

## 2024-02-28 DIAGNOSIS — M35.05 SJOGREN'S SYNDROME WITH INFLAMMATORY ARTHRITIS (HCC): ICD-10-CM

## 2024-02-28 DIAGNOSIS — I10 ESSENTIAL HYPERTENSION: ICD-10-CM

## 2024-02-28 LAB
ALBUMIN SERPL-MCNC: 4.4 G/DL (ref 3.5–5.2)
ALP SERPL-CCNC: 57 U/L (ref 35–104)
ALT SERPL-CCNC: 29 U/L (ref 0–32)
ANION GAP SERPL CALCULATED.3IONS-SCNC: 11 MMOL/L (ref 7–16)
AST SERPL-CCNC: 32 U/L (ref 0–31)
BASOPHILS # BLD: 0.05 K/UL (ref 0–0.2)
BASOPHILS NFR BLD: 1 % (ref 0–2)
BILIRUB SERPL-MCNC: 0.7 MG/DL (ref 0–1.2)
BILIRUB UR QL STRIP: NEGATIVE
BUN SERPL-MCNC: 15 MG/DL (ref 6–23)
CALCIUM SERPL-MCNC: 9.6 MG/DL (ref 8.6–10.2)
CHLORIDE SERPL-SCNC: 102 MMOL/L (ref 98–107)
CHOLEST SERPL-MCNC: 167 MG/DL
CLARITY UR: CLEAR
CO2 SERPL-SCNC: 24 MMOL/L (ref 22–29)
COLOR UR: YELLOW
COMMENT: NORMAL
CREAT SERPL-MCNC: 0.7 MG/DL (ref 0.5–1)
CRP SERPL HS-MCNC: <3 MG/L (ref 0–5)
EOSINOPHIL # BLD: 0.18 K/UL (ref 0.05–0.5)
EOSINOPHILS RELATIVE PERCENT: 3 % (ref 0–6)
ERYTHROCYTE [DISTWIDTH] IN BLOOD BY AUTOMATED COUNT: 13 % (ref 11.5–15)
ERYTHROCYTE [SEDIMENTATION RATE] IN BLOOD BY WESTERGREN METHOD: 1 MM/HR (ref 0–20)
GFR SERPL CREATININE-BSD FRML MDRD: >60 ML/MIN/1.73M2
GLUCOSE SERPL-MCNC: 107 MG/DL (ref 74–99)
GLUCOSE UR STRIP-MCNC: NEGATIVE MG/DL
HBA1C MFR BLD: 5.9 % (ref 4–5.6)
HCT VFR BLD AUTO: 43.2 % (ref 34–48)
HDLC SERPL-MCNC: 58 MG/DL
HGB BLD-MCNC: 14.2 G/DL (ref 11.5–15.5)
HGB UR QL STRIP.AUTO: NEGATIVE
IMM GRANULOCYTES # BLD AUTO: <0.03 K/UL (ref 0–0.58)
IMM GRANULOCYTES NFR BLD: 0 % (ref 0–5)
KETONES UR STRIP-MCNC: NEGATIVE MG/DL
LDLC SERPL CALC-MCNC: 82 MG/DL
LEUKOCYTE ESTERASE UR QL STRIP: NEGATIVE
LYMPHOCYTES NFR BLD: 1.94 K/UL (ref 1.5–4)
LYMPHOCYTES RELATIVE PERCENT: 35 % (ref 20–42)
MCH RBC QN AUTO: 30.5 PG (ref 26–35)
MCHC RBC AUTO-ENTMCNC: 32.9 G/DL (ref 32–34.5)
MCV RBC AUTO: 92.7 FL (ref 80–99.9)
MONOCYTES NFR BLD: 0.41 K/UL (ref 0.1–0.95)
MONOCYTES NFR BLD: 7 % (ref 2–12)
NEUTROPHILS NFR BLD: 54 % (ref 43–80)
NEUTS SEG NFR BLD: 3.01 K/UL (ref 1.8–7.3)
NITRITE UR QL STRIP: NEGATIVE
PH UR STRIP: 6.5 [PH] (ref 5–9)
PLATELET # BLD AUTO: 242 K/UL (ref 130–450)
PMV BLD AUTO: 9.6 FL (ref 7–12)
POTASSIUM SERPL-SCNC: 4.1 MMOL/L (ref 3.5–5)
PROT SERPL-MCNC: 6.8 G/DL (ref 6.4–8.3)
PROT UR STRIP-MCNC: NEGATIVE MG/DL
RBC # BLD AUTO: 4.66 M/UL (ref 3.5–5.5)
RHEUMATOID FACT SER NEPH-ACNC: <10 IU/ML (ref 0–13)
SODIUM SERPL-SCNC: 137 MMOL/L (ref 132–146)
SP GR UR STRIP: 1.02 (ref 1–1.03)
TRIGL SERPL-MCNC: 137 MG/DL
UROBILINOGEN UR STRIP-ACNC: 0.2 EU/DL (ref 0–1)
VLDLC SERPL CALC-MCNC: 27 MG/DL
WBC OTHER # BLD: 5.6 K/UL (ref 4.5–11.5)

## 2024-02-28 PROCEDURE — 80053 COMPREHEN METABOLIC PANEL: CPT

## 2024-02-28 PROCEDURE — 84165 PROTEIN E-PHORESIS SERUM: CPT

## 2024-02-28 PROCEDURE — 81003 URINALYSIS AUTO W/O SCOPE: CPT

## 2024-02-28 PROCEDURE — 86431 RHEUMATOID FACTOR QUANT: CPT

## 2024-02-28 PROCEDURE — 80061 LIPID PANEL: CPT

## 2024-02-28 PROCEDURE — 83036 HEMOGLOBIN GLYCOSYLATED A1C: CPT

## 2024-02-28 PROCEDURE — 36415 COLL VENOUS BLD VENIPUNCTURE: CPT

## 2024-02-28 PROCEDURE — 87081 CULTURE SCREEN ONLY: CPT

## 2024-02-28 PROCEDURE — 84155 ASSAY OF PROTEIN SERUM: CPT

## 2024-02-28 PROCEDURE — 85652 RBC SED RATE AUTOMATED: CPT

## 2024-02-28 PROCEDURE — 86140 C-REACTIVE PROTEIN: CPT

## 2024-02-28 PROCEDURE — 85025 COMPLETE CBC W/AUTO DIFF WBC: CPT

## 2024-03-01 LAB
MICROORGANISM SPEC CULT: NORMAL
SPECIMEN DESCRIPTION: NORMAL

## 2024-03-02 LAB
ALBUMIN SERPL-MCNC: 3.6 G/DL (ref 3.5–4.7)
ALPHA1 GLOB SERPL ELPH-MCNC: 0.2 G/DL (ref 0.2–0.4)
ALPHA2 GLOB SERPL ELPH-MCNC: 0.6 G/DL (ref 0.5–1)
B-GLOBULIN SERPL ELPH-MCNC: 0.9 G/DL (ref 0.8–1.3)
GAMMA GLOB SERPL ELPH-MCNC: 0.8 G/DL (ref 0.7–1.6)
PATHOLOGIST: ABNORMAL
PROT PATTERN SERPL ELPH-IMP: ABNORMAL
PROT SERPL-MCNC: 6.2 G/DL (ref 6.4–8.3)

## 2024-03-04 ENCOUNTER — TELEPHONE (OUTPATIENT)
Dept: ADMINISTRATIVE | Age: 74
End: 2024-03-04

## 2024-03-04 NOTE — TELEPHONE ENCOUNTER
Pt scheduled sooner appt w/Robbin in May; experiencing fatigue & hair loss; requesting order to be placed to get labs before the 5/8 appt.  Leave message advising order in system.  Call [phone]

## 2024-03-05 DIAGNOSIS — E03.8 HYPOTHYROIDISM DUE TO HASHIMOTO'S THYROIDITIS: Primary | ICD-10-CM

## 2024-03-05 DIAGNOSIS — E06.3 HYPOTHYROIDISM DUE TO HASHIMOTO'S THYROIDITIS: Primary | ICD-10-CM

## 2024-03-05 NOTE — TELEPHONE ENCOUNTER
Pt having fatigue and hairloss. Has appt with you 5/8/24 wants to see if she can have labs prior to appt.

## 2024-03-13 ENCOUNTER — HOSPITAL ENCOUNTER (OUTPATIENT)
Age: 74
Discharge: HOME | End: 2024-03-15

## 2024-03-13 LAB
ABO + RH BLD: NORMAL
ARM BAND NUMBER: NORMAL
BLOOD BANK SAMPLE EXPIRATION: NORMAL
BLOOD GROUP ANTIBODIES SERPL: NEGATIVE

## 2024-03-13 PROCEDURE — 86850 RBC ANTIBODY SCREEN: CPT

## 2024-03-13 PROCEDURE — 86900 BLOOD TYPING SEROLOGIC ABO: CPT

## 2024-03-13 PROCEDURE — 86901 BLOOD TYPING SEROLOGIC RH(D): CPT

## 2024-03-14 ENCOUNTER — HOSPITAL ENCOUNTER (OUTPATIENT)
Age: 74
Discharge: HOME | End: 2024-03-16

## 2024-03-14 LAB
ANION GAP SERPL CALCULATED.3IONS-SCNC: 10 MMOL/L (ref 7–16)
BUN SERPL-MCNC: 19 MG/DL (ref 6–23)
CALCIUM SERPL-MCNC: 8.8 MG/DL (ref 8.6–10.2)
CHLORIDE SERPL-SCNC: 104 MMOL/L (ref 98–107)
CO2 SERPL-SCNC: 25 MMOL/L (ref 22–29)
CREAT SERPL-MCNC: 0.7 MG/DL (ref 0.5–1)
GFR SERPL CREATININE-BSD FRML MDRD: >60 ML/MIN/1.73M2
GLUCOSE SERPL-MCNC: 99 MG/DL (ref 74–99)
HCT VFR BLD AUTO: 34.1 % (ref 34–48)
HGB BLD-MCNC: 11.2 G/DL (ref 11.5–15.5)
POTASSIUM SERPL-SCNC: 4.2 MMOL/L (ref 3.5–5)
SODIUM SERPL-SCNC: 139 MMOL/L (ref 132–146)

## 2024-03-14 PROCEDURE — 85018 HEMOGLOBIN: CPT

## 2024-03-14 PROCEDURE — 85014 HEMATOCRIT: CPT

## 2024-03-14 PROCEDURE — 80048 BASIC METABOLIC PNL TOTAL CA: CPT

## 2024-04-08 DIAGNOSIS — E03.8 HYPOTHYROIDISM DUE TO HASHIMOTO'S THYROIDITIS: ICD-10-CM

## 2024-04-08 DIAGNOSIS — E06.3 HYPOTHYROIDISM DUE TO HASHIMOTO'S THYROIDITIS: ICD-10-CM

## 2024-04-09 RX ORDER — LEVOTHYROXINE SODIUM 0.07 MG/1
TABLET ORAL
Qty: 30 TABLET | Refills: 0 | Status: SHIPPED | OUTPATIENT
Start: 2024-04-09

## 2024-04-22 ENCOUNTER — HOSPITAL ENCOUNTER (OUTPATIENT)
Age: 74
Discharge: HOME | End: 2024-04-22
Payer: MEDICARE

## 2024-04-22 ENCOUNTER — TELEPHONE (OUTPATIENT)
Dept: ENDOCRINOLOGY | Age: 74
End: 2024-04-22

## 2024-04-22 DIAGNOSIS — E06.3 HYPOTHYROIDISM DUE TO HASHIMOTO'S THYROIDITIS: ICD-10-CM

## 2024-04-22 DIAGNOSIS — E03.8 HYPOTHYROIDISM DUE TO HASHIMOTO'S THYROIDITIS: ICD-10-CM

## 2024-04-22 DIAGNOSIS — Z79.890 HORMONE REPLACEMENT THERAPY: ICD-10-CM

## 2024-04-22 LAB
25(OH)D3 SERPL-MCNC: 46.6 NG/ML (ref 30–100)
ERYTHROCYTE [DISTWIDTH] IN BLOOD BY AUTOMATED COUNT: 13.2 % (ref 11.5–15)
ESTRADIOL LEVEL: 49.3 PG/ML
FERRITIN SERPL-MCNC: 267 NG/ML
HBA1C MFR BLD: 5.4 % (ref 4–5.6)
HCT VFR BLD AUTO: 41.7 % (ref 34–48)
HGB BLD-MCNC: 13.3 G/DL (ref 11.5–15.5)
MCH RBC QN AUTO: 29.8 PG (ref 26–35)
MCHC RBC AUTO-ENTMCNC: 31.9 G/DL (ref 32–34.5)
MCV RBC AUTO: 93.5 FL (ref 80–99.9)
PLATELET # BLD AUTO: 245 K/UL (ref 130–450)
PMV BLD AUTO: 9.5 FL (ref 7–12)
RBC # BLD AUTO: 4.46 M/UL (ref 3.5–5.5)
T4 FREE SERPL-MCNC: 1.3 NG/DL (ref 0.9–1.7)
T4 FREE SERPL-MCNC: 1.3 NG/DL (ref 0.9–1.7)
TSH SERPL DL<=0.05 MIU/L-ACNC: 1.28 UIU/ML (ref 0.27–4.2)
TSH SERPL DL<=0.05 MIU/L-ACNC: 1.29 UIU/ML (ref 0.27–4.2)
WBC OTHER # BLD: 6.3 K/UL (ref 4.5–11.5)

## 2024-04-22 PROCEDURE — 84443 ASSAY THYROID STIM HORMONE: CPT

## 2024-04-22 PROCEDURE — 84144 ASSAY OF PROGESTERONE: CPT

## 2024-04-22 PROCEDURE — 82670 ASSAY OF TOTAL ESTRADIOL: CPT

## 2024-04-22 PROCEDURE — 84140 ASSAY OF PREGNENOLONE: CPT

## 2024-04-22 PROCEDURE — 82306 VITAMIN D 25 HYDROXY: CPT

## 2024-04-22 PROCEDURE — 84439 ASSAY OF FREE THYROXINE: CPT

## 2024-04-22 PROCEDURE — 85027 COMPLETE CBC AUTOMATED: CPT

## 2024-04-22 PROCEDURE — 36415 COLL VENOUS BLD VENIPUNCTURE: CPT

## 2024-04-22 PROCEDURE — 84270 ASSAY OF SEX HORMONE GLOBUL: CPT

## 2024-04-22 PROCEDURE — 82627 DEHYDROEPIANDROSTERONE: CPT

## 2024-04-22 PROCEDURE — 83036 HEMOGLOBIN GLYCOSYLATED A1C: CPT

## 2024-04-22 PROCEDURE — 82728 ASSAY OF FERRITIN: CPT

## 2024-04-22 PROCEDURE — 84403 ASSAY OF TOTAL TESTOSTERONE: CPT

## 2024-04-22 NOTE — TELEPHONE ENCOUNTER
----- Message from JENNY Bush - CNS sent at 4/22/2024  8:40 AM EDT -----  Please call pt and inform her I have reviewed BW and will discuss with her at upcoming appt

## 2024-04-23 LAB
DHEA-S SERPL-MCNC: 3 UG/DL (ref 12–154)
PROGEST SERPL-MCNC: 3.73 NG/ML
SHBG SERPL-SCNC: 23 NMOL/L (ref 17–125)
TESTOST FREE MFR SERPL: ABNORMAL PG/ML (ref 0.6–3.8)
TESTOST SERPL-MCNC: <3 NG/DL (ref 3–41)
TESTOSTERONE, BIOAVAILABLE: ABNORMAL NG/DL (ref 1.5–9.4)

## 2024-04-24 ENCOUNTER — TELEPHONE (OUTPATIENT)
Dept: FAMILY MEDICINE CLINIC | Age: 74
End: 2024-04-24

## 2024-04-24 DIAGNOSIS — J40 SINOBRONCHITIS: ICD-10-CM

## 2024-04-24 DIAGNOSIS — J32.9 SINOBRONCHITIS: ICD-10-CM

## 2024-04-24 RX ORDER — DOXYCYCLINE HYCLATE 100 MG
100 TABLET ORAL 2 TIMES DAILY
Qty: 20 TABLET | Refills: 0 | Status: SHIPPED | OUTPATIENT
Start: 2024-04-24 | End: 2024-05-04

## 2024-04-24 NOTE — TELEPHONE ENCOUNTER
Spoke to patient about rescheduling her and she wants to know if she could get a refill of the doxycycline. It was originally prescribed for sinuses and pneumonia. After that, she had surgery and it caused a bacterial infection and gave her explosive diarrhea and a bad head cold. She noticed that the doxycycline was making that go away. She wants to know if she could have another script of it?

## 2024-04-26 LAB — PREG SERPL-MCNC: 44 NG/DL (ref 15–132)

## 2024-05-08 ENCOUNTER — OFFICE VISIT (OUTPATIENT)
Dept: ENDOCRINOLOGY | Age: 74
End: 2024-05-08
Payer: MEDICARE

## 2024-05-08 VITALS
DIASTOLIC BLOOD PRESSURE: 77 MMHG | HEART RATE: 58 BPM | WEIGHT: 154 LBS | SYSTOLIC BLOOD PRESSURE: 137 MMHG | BODY MASS INDEX: 25.66 KG/M2 | HEIGHT: 65 IN | OXYGEN SATURATION: 87 %

## 2024-05-08 DIAGNOSIS — R79.89 LOW SERUM CORTISOL LEVEL: ICD-10-CM

## 2024-05-08 DIAGNOSIS — E04.2 MULTINODULAR GOITER: ICD-10-CM

## 2024-05-08 DIAGNOSIS — E06.3 HYPOTHYROIDISM DUE TO HASHIMOTO'S THYROIDITIS: Primary | ICD-10-CM

## 2024-05-08 DIAGNOSIS — E03.8 HYPOTHYROIDISM DUE TO HASHIMOTO'S THYROIDITIS: Primary | ICD-10-CM

## 2024-05-08 DIAGNOSIS — R53.82 CHRONIC FATIGUE: ICD-10-CM

## 2024-05-08 PROCEDURE — 3078F DIAST BP <80 MM HG: CPT | Performed by: CLINICAL NURSE SPECIALIST

## 2024-05-08 PROCEDURE — 3075F SYST BP GE 130 - 139MM HG: CPT | Performed by: CLINICAL NURSE SPECIALIST

## 2024-05-08 PROCEDURE — 99214 OFFICE O/P EST MOD 30 MIN: CPT | Performed by: CLINICAL NURSE SPECIALIST

## 2024-05-08 PROCEDURE — 1123F ACP DISCUSS/DSCN MKR DOCD: CPT | Performed by: CLINICAL NURSE SPECIALIST

## 2024-05-08 RX ORDER — LEVOTHYROXINE SODIUM 0.07 MG/1
TABLET ORAL
Qty: 90 TABLET | Refills: 2 | Status: SHIPPED | OUTPATIENT
Start: 2024-05-08

## 2024-05-08 NOTE — PROGRESS NOTES
[facility]  [facility] Department of Endocrinology Diabetes and Metabolism   [address], [place], [place], [address]   Phone: [phone]  Fax: [phone]    Date of Service: 5/8/2024  Primary Care Physician: Lola Mukherjee DO  Provider: JENNY Bush - CNS      Reason for the visit:  Primary Hypothyroidism    History of Present Illness:  The history is provided by the patient. No  was used. Accuracy of the patient data is excellent.         Arely hCilders is a very pleasant 74 y.o. female seen today for management of hypothyroidism     The patient was diagnosed with hypothyroidism 5 years ago and since then has been on thyroid hormones replacement.     The patient is currently on 75 mcg daily Mon thru Saturday, none on Sunday    Patient denied any history of  swelling in the area of the thyroid gland, weight loss, change in appetite, nervousness, anxiety, irritability, tremor, sweating, heat intolerance, changes in bowel habits, muscle weakness or difficulty sleeping.    Lab Results   Component Value Date    TSH 1.29 04/22/2024    TSH 1.28 04/22/2024    FT3 2.5 02/24/2022    T4FREE 1.3 04/22/2024    T4FREE 1.3 04/22/2024           PAST MEDICAL HISTORY   Past Medical History:   Diagnosis Date    Adverse effect of female hormone replacement therapy     Dr. Banegas    Bipolar disorder (HCC)     Mixed, has required hospitalization in the past Dr. Montes    Depression     GERD (gastroesophageal reflux disease)     GERD Management per Dr. Landers    Hyperlipidemia     Management per [facility], clinical Cardiology, Dr. Valdez    Hypertension     Management per [facility], clinical Cardiology    Hypothyroidism     with nodule - Managed by Dr. Reinoso    Sjogren's syndrome with keratoconjunctivitis sicca (HCC)     Thyroid disease        PAST SURGICAL HISTORY   Past Surgical History:   Procedure Laterality Date    HYSTERECTOMY (CERVIX STATUS

## 2024-05-16 ENCOUNTER — OFFICE VISIT (OUTPATIENT)
Dept: RHEUMATOLOGY | Age: 74
End: 2024-05-16
Payer: MEDICARE

## 2024-05-16 VITALS — HEIGHT: 64 IN | BODY MASS INDEX: 28 KG/M2 | WEIGHT: 164 LBS

## 2024-05-16 DIAGNOSIS — E78.2 MIXED HYPERLIPIDEMIA: ICD-10-CM

## 2024-05-16 DIAGNOSIS — M35.01 SJOGREN'S SYNDROME WITH KERATOCONJUNCTIVITIS SICCA (HCC): Primary | ICD-10-CM

## 2024-05-16 DIAGNOSIS — Z79.899 HIGH RISK MEDICATION USE: ICD-10-CM

## 2024-05-16 PROCEDURE — 1123F ACP DISCUSS/DSCN MKR DOCD: CPT | Performed by: INTERNAL MEDICINE

## 2024-05-16 PROCEDURE — 99214 OFFICE O/P EST MOD 30 MIN: CPT | Performed by: INTERNAL MEDICINE

## 2024-05-16 PROCEDURE — G2211 COMPLEX E/M VISIT ADD ON: HCPCS | Performed by: INTERNAL MEDICINE

## 2024-05-16 RX ORDER — HYDROXYCHLOROQUINE SULFATE 200 MG/1
200 TABLET, FILM COATED ORAL DAILY
Qty: 30 TABLET | Refills: 5 | Status: SHIPPED | OUTPATIENT
Start: 2024-05-16

## 2024-05-16 ASSESSMENT — ENCOUNTER SYMPTOMS
NAUSEA: 0
DIARRHEA: 0
VOMITING: 0
TROUBLE SWALLOWING: 0

## 2024-05-16 NOTE — PROGRESS NOTES
infection.  She has a lot of issues with dry mouth but no oral ulcers or poor dentition though she does get a dry cough.  The cough has been going on for about the last year.  She has dry eyes and has plugs in place and uses Restasis.  She has some osteoarthritis but to this point no known extraglandular manifestations.  Feels fatigued but does not sleep well and snores at night.    Past Medical History:   Diagnosis Date    Adverse effect of female hormone replacement therapy     Dr. Banegas    Bipolar disorder (HCC)     Mixed, has required hospitalization in the past Dr. Montes    Depression     GERD (gastroesophageal reflux disease)     GERD Management per Dr. Landers    Hyperlipidemia     Management per [facility], clinical Cardiology, Dr. Valdez    Hypertension     Management per [facility], clinical Cardiology    Hypothyroidism     with nodule - Managed by Dr. Reinoso    Sjogren's syndrome with keratoconjunctivitis sicca ([facility])     Thyroid disease         Review of Systems   Constitutional:  Positive for fatigue. Negative for fever.   HENT:  Negative for mouth sores and trouble swallowing.    Cardiovascular:  Negative for chest pain.   Gastrointestinal:  Negative for abdominal pain, diarrhea, nausea and vomiting.   Genitourinary:  Negative for dysuria and hematuria.   Musculoskeletal:  Positive for arthralgias and joint swelling.   Skin:  Negative for color change and rash.   Neurological:  Negative for weakness and numbness.   Hematological:  Negative for adenopathy.   All other systems reviewed and are negative.         Objective   There were no vitals filed for this visit.     Physical Exam  Constitutional:       General: She is not in acute distress.     Appearance: Normal appearance.   HENT:      Head: Normocephalic and atraumatic.      Nose: Nose normal.   Eyes:      General: No scleral icterus.  Pulmonary:      Effort: Pulmonary effort is normal.      Breath sounds: Normal breath sounds.

## 2024-06-25 ENCOUNTER — OFFICE VISIT (OUTPATIENT)
Dept: FAMILY MEDICINE CLINIC | Age: 74
End: 2024-06-25

## 2024-06-25 VITALS
TEMPERATURE: 97 F | OXYGEN SATURATION: 97 % | WEIGHT: 154.3 LBS | HEART RATE: 63 BPM | HEIGHT: 64 IN | RESPIRATION RATE: 18 BRPM | BODY MASS INDEX: 26.34 KG/M2 | SYSTOLIC BLOOD PRESSURE: 110 MMHG | DIASTOLIC BLOOD PRESSURE: 70 MMHG

## 2024-06-25 DIAGNOSIS — N76.1 CHRONIC VAGINITIS: ICD-10-CM

## 2024-06-25 DIAGNOSIS — N39.46 MIXED STRESS AND URGE URINARY INCONTINENCE: ICD-10-CM

## 2024-06-25 DIAGNOSIS — R79.89 ELEVATED FERRITIN: Primary | ICD-10-CM

## 2024-06-25 DIAGNOSIS — L65.9 ALOPECIA: ICD-10-CM

## 2024-06-25 RX ORDER — FLUCONAZOLE 150 MG/1
150 TABLET ORAL DAILY
Qty: 3 TABLET | Refills: 2 | Status: SHIPPED | OUTPATIENT
Start: 2024-06-25

## 2024-06-25 RX ORDER — AZITHROMYCIN 250 MG/1
250 TABLET, FILM COATED ORAL DAILY
Qty: 1 PACKET | Refills: 0 | Status: SHIPPED | OUTPATIENT
Start: 2024-06-25

## 2024-06-25 RX ORDER — NYSTATIN 100000 [USP'U]/G
POWDER TOPICAL 4 TIMES DAILY
COMMUNITY
End: 2024-06-25 | Stop reason: SDUPTHER

## 2024-06-25 RX ORDER — OXYBUTYNIN CHLORIDE 5 MG/1
5 TABLET ORAL 3 TIMES DAILY
Qty: 90 TABLET | Refills: 3 | Status: SHIPPED | OUTPATIENT
Start: 2024-06-25

## 2024-06-25 RX ORDER — BREXPIPRAZOLE 0.5 MG/1
0.5 TABLET ORAL ONCE
COMMUNITY
Start: 2024-06-21

## 2024-06-25 RX ORDER — NYSTATIN 100000 [USP'U]/G
POWDER TOPICAL 4 TIMES DAILY
Qty: 30 G | Refills: 5 | Status: SHIPPED | OUTPATIENT
Start: 2024-06-25

## 2024-06-25 RX ORDER — AZITHROMYCIN 250 MG/1
250 TABLET, FILM COATED ORAL DAILY
COMMUNITY
End: 2024-06-25 | Stop reason: SDUPTHER

## 2024-06-25 SDOH — ECONOMIC STABILITY: HOUSING INSECURITY
IN THE LAST 12 MONTHS, WAS THERE A TIME WHEN YOU DID NOT HAVE A STEADY PLACE TO SLEEP OR SLEPT IN A SHELTER (INCLUDING NOW)?: NO

## 2024-06-25 SDOH — ECONOMIC STABILITY: FOOD INSECURITY: WITHIN THE PAST 12 MONTHS, THE FOOD YOU BOUGHT JUST DIDN'T LAST AND YOU DIDN'T HAVE MONEY TO GET MORE.: NEVER TRUE

## 2024-06-25 SDOH — ECONOMIC STABILITY: INCOME INSECURITY: HOW HARD IS IT FOR YOU TO PAY FOR THE VERY BASICS LIKE FOOD, HOUSING, MEDICAL CARE, AND HEATING?: NOT HARD AT ALL

## 2024-06-25 SDOH — ECONOMIC STABILITY: FOOD INSECURITY: WITHIN THE PAST 12 MONTHS, YOU WORRIED THAT YOUR FOOD WOULD RUN OUT BEFORE YOU GOT MONEY TO BUY MORE.: NEVER TRUE

## 2024-06-25 NOTE — PROGRESS NOTES
6/25/2024    Chief Complaint   Patient presents with    Medication Refill    Other     Would like a ferritin level done    Alopecia       HPI    Arely Childers is a 74 y.o. patient that presents today for:    Alopecia: Patient complains of hair loss.  The hair loss is diffuse in distribution, with onset approximately several months ago. Patient describes symptoms of hair thinning.   Patient does not have dietary restrictions. Patient does not wear a high tension hair style. Patient does not have serious medical illnesses or major weight loss during time of hair loss.        Patient's past medical, surgical, social and/or family history reviewed, updated in chart, and are non-contributory (unless otherwise stated).  Medications and allergies also reviewed and updated in chart.     ROS negative unless otherwise specified    Physical Exam  Temp Readings from Last 3 Encounters:   06/25/24 97 °F (36.1 °C)   02/13/24 97.7 °F (36.5 °C)   01/31/24 97.5 °F (36.4 °C) (Temporal)     Wt Readings from Last 3 Encounters:   06/25/24 70 kg (154 lb 4.8 oz)   05/16/24 74.4 kg (164 lb)   05/08/24 69.9 kg (154 lb)     BP Readings from Last 3 Encounters:   06/25/24 110/70   05/08/24 137/77   02/13/24 126/64     Pulse Readings from Last 3 Encounters:   06/25/24 63   05/08/24 58   02/13/24 64       General appearance: alert, well appearing, and in no distress, oriented to person, place, and time and normal appearing weight.    CVS exam: normal rate, regular rhythm, normal S1, S2, no murmurs, rubs, clicks or gallops.  Radial pulses 2+ bilateral.  PT/DP pulse 2+ bilat.  No C/C/E    Chest: clear to auscultation, no wheezes, rales or rhonchi, symmetric air entry.     Abdomen: Soft, non-tender, non-distended, positive BS in all 4 quadrants    Extremities:Dorsalis pedis pulses palpated bilaterally, no clubbing, cyanosis, edema or erythema,     SKIN: no lesions, jaundice, petechiae, pallor, cyanosis, ecchymosis    NEURO: gross

## 2024-06-26 ENCOUNTER — TELEPHONE (OUTPATIENT)
Dept: ENDOCRINOLOGY | Age: 74
End: 2024-06-26

## 2024-06-26 DIAGNOSIS — E03.8 HYPOTHYROIDISM DUE TO HASHIMOTO'S THYROIDITIS: ICD-10-CM

## 2024-06-26 DIAGNOSIS — E06.3 HYPOTHYROIDISM DUE TO HASHIMOTO'S THYROIDITIS: Primary | ICD-10-CM

## 2024-06-26 DIAGNOSIS — E06.3 HYPOTHYROIDISM DUE TO HASHIMOTO'S THYROIDITIS: ICD-10-CM

## 2024-06-26 DIAGNOSIS — E03.8 HYPOTHYROIDISM DUE TO HASHIMOTO'S THYROIDITIS: Primary | ICD-10-CM

## 2024-06-26 RX ORDER — LEVOTHYROXINE SODIUM 75 MCG
75 TABLET ORAL DAILY
Qty: 90 TABLET | Refills: 1
Start: 2024-06-26 | End: 2024-06-26

## 2024-06-26 RX ORDER — LEVOTHYROXINE SODIUM 75 MCG
TABLET ORAL
Qty: 24 TABLET | Refills: 3 | Status: SHIPPED | OUTPATIENT
Start: 2024-06-26

## 2024-06-26 RX ORDER — LEVOTHYROXINE SODIUM 75 MCG
TABLET ORAL
Qty: 24 TABLET | Refills: 3
Start: 2024-06-26 | End: 2024-06-26 | Stop reason: SDUPTHER

## 2024-06-26 NOTE — TELEPHONE ENCOUNTER
Patient would like to be changed to name brand synthroid since she is experiencing hair loss and fatigue while on the generic.   OK to send?

## 2024-08-06 ENCOUNTER — HOSPITAL ENCOUNTER (OUTPATIENT)
Age: 74
Discharge: HOME | End: 2024-08-06
Payer: MEDICARE

## 2024-08-06 DIAGNOSIS — L65.9 ALOPECIA: ICD-10-CM

## 2024-08-06 LAB
FOLATE SERPL-MCNC: >20 NG/ML (ref 4.8–24.2)
VIT B12 SERPL-MCNC: 716 PG/ML (ref 211–946)

## 2024-08-06 PROCEDURE — 82607 VITAMIN B-12: CPT

## 2024-08-06 PROCEDURE — 36415 COLL VENOUS BLD VENIPUNCTURE: CPT

## 2024-08-06 PROCEDURE — 82746 ASSAY OF FOLIC ACID SERUM: CPT

## 2024-08-06 PROCEDURE — 84630 ASSAY OF ZINC: CPT

## 2024-08-10 LAB — ZINC SERPL-MCNC: 80.8 UG/DL (ref 60–120)

## 2024-09-12 ENCOUNTER — OFFICE VISIT (OUTPATIENT)
Dept: FAMILY MEDICINE CLINIC | Age: 74
End: 2024-09-12
Payer: MEDICARE

## 2024-09-12 VITALS
HEART RATE: 59 BPM | SYSTOLIC BLOOD PRESSURE: 112 MMHG | RESPIRATION RATE: 16 BRPM | WEIGHT: 157 LBS | DIASTOLIC BLOOD PRESSURE: 78 MMHG | TEMPERATURE: 97.5 F | BODY MASS INDEX: 26.8 KG/M2 | OXYGEN SATURATION: 97 % | HEIGHT: 64 IN

## 2024-09-12 DIAGNOSIS — E03.9 ACQUIRED HYPOTHYROIDISM: ICD-10-CM

## 2024-09-12 DIAGNOSIS — I10 ESSENTIAL HYPERTENSION: Primary | ICD-10-CM

## 2024-09-12 DIAGNOSIS — N39.46 MIXED INCONTINENCE: ICD-10-CM

## 2024-09-12 PROCEDURE — 99214 OFFICE O/P EST MOD 30 MIN: CPT | Performed by: FAMILY MEDICINE

## 2024-09-12 PROCEDURE — G2211 COMPLEX E/M VISIT ADD ON: HCPCS | Performed by: FAMILY MEDICINE

## 2024-09-12 PROCEDURE — 3078F DIAST BP <80 MM HG: CPT | Performed by: FAMILY MEDICINE

## 2024-09-12 PROCEDURE — 1123F ACP DISCUSS/DSCN MKR DOCD: CPT | Performed by: FAMILY MEDICINE

## 2024-09-12 PROCEDURE — 3074F SYST BP LT 130 MM HG: CPT | Performed by: FAMILY MEDICINE

## 2024-09-16 ENCOUNTER — HOSPITAL ENCOUNTER (OUTPATIENT)
Age: 74
Discharge: HOME | End: 2024-09-16
Payer: MEDICARE

## 2024-09-16 DIAGNOSIS — I10 ESSENTIAL HYPERTENSION: ICD-10-CM

## 2024-09-16 LAB
ALBUMIN SERPL-MCNC: 4.4 G/DL (ref 3.5–5.2)
ALP SERPL-CCNC: 64 U/L (ref 35–104)
ALT SERPL-CCNC: 17 U/L (ref 0–32)
ANION GAP SERPL CALCULATED.3IONS-SCNC: 10 MMOL/L (ref 7–16)
AST SERPL-CCNC: 23 U/L (ref 0–31)
BILIRUB SERPL-MCNC: 0.4 MG/DL (ref 0–1.2)
BUN SERPL-MCNC: 12 MG/DL (ref 6–23)
CALCIUM SERPL-MCNC: 9.4 MG/DL (ref 8.6–10.2)
CHLORIDE SERPL-SCNC: 104 MMOL/L (ref 98–107)
CHOLEST SERPL-MCNC: 152 MG/DL
CHOLEST SERPL-MCNC: 158 MG/DL
CO2 SERPL-SCNC: 26 MMOL/L (ref 22–29)
CREAT SERPL-MCNC: 0.7 MG/DL (ref 0.5–1)
ERYTHROCYTE [DISTWIDTH] IN BLOOD BY AUTOMATED COUNT: 13.2 % (ref 11.5–15)
GFR, ESTIMATED: 90 ML/MIN/1.73M2
GLUCOSE SERPL-MCNC: 113 MG/DL (ref 74–99)
HCT VFR BLD AUTO: 41.5 % (ref 34–48)
HDLC SERPL-MCNC: 62 MG/DL
HDLC SERPL-MCNC: 62 MG/DL
HGB BLD-MCNC: 13.6 G/DL (ref 11.5–15.5)
LDLC SERPL CALC-MCNC: 67 MG/DL
LDLC SERPL CALC-MCNC: 73 MG/DL
MCH RBC QN AUTO: 30 PG (ref 26–35)
MCHC RBC AUTO-ENTMCNC: 32.8 G/DL (ref 32–34.5)
MCV RBC AUTO: 91.4 FL (ref 80–99.9)
PLATELET # BLD AUTO: 241 K/UL (ref 130–450)
PMV BLD AUTO: 9.6 FL (ref 7–12)
POTASSIUM SERPL-SCNC: 4.6 MMOL/L (ref 3.5–5)
PROT SERPL-MCNC: 6.5 G/DL (ref 6.4–8.3)
RBC # BLD AUTO: 4.54 M/UL (ref 3.5–5.5)
SODIUM SERPL-SCNC: 140 MMOL/L (ref 132–146)
TRIGL SERPL-MCNC: 114 MG/DL
TRIGL SERPL-MCNC: 116 MG/DL
VLDLC SERPL CALC-MCNC: 23 MG/DL
VLDLC SERPL CALC-MCNC: 23 MG/DL
WBC OTHER # BLD: 5.8 K/UL (ref 4.5–11.5)

## 2024-09-16 PROCEDURE — 36415 COLL VENOUS BLD VENIPUNCTURE: CPT

## 2024-09-16 PROCEDURE — 85027 COMPLETE CBC AUTOMATED: CPT

## 2024-09-16 PROCEDURE — 80053 COMPREHEN METABOLIC PANEL: CPT

## 2024-09-16 PROCEDURE — 80061 LIPID PANEL: CPT

## 2024-09-23 DIAGNOSIS — E04.2 MULTINODULAR GOITER: Primary | ICD-10-CM

## 2024-11-27 NOTE — TELEPHONE ENCOUNTER
"Progress Note - Hospitalist   Name: Saroj Taveras Firp 76 y.o. male I MRN: [id number]  Unit/Bed#: E5 -01 I Date of Admission: 11/23/2024   Date of Service: 11/27/2024 I Hospital Day: 3    Assessment & Plan  Occasional tremors  Patient is a 76-year-old male with past medical history significant for end-stage renal disease on dialysis who was sent to the ER due to concern concerns over tremors    Patient was assessed by the neurology team in the ER who noted \"generalized nonrhythmic tremors affecting bilateral upper and lower extremities\"  Has been ongoing intermittently for several days, having difficulty tolerating dialysis due to tremors  Not felt to be secondary to epileptic spells: Patient alert active and responsive during the tremors  Per neurology: Most likely related to dialysis, electrolyte imbalance/metabolic derangement however at this time, there does not appear to be any notable abnormalities in labs apart from recent CO poisoning  CTA head and neck without evidence of acute abnormalities or hemodynamically significant stenosis, CT C spine without central canal stenosis   MRI brain with and without contrast scheduled for this morning  EEG without evidence of epileptic activity  Started on Keppra   Continue low-dose Klonopin as needed  Infectious work-up negative, MDS2 and Vit E levels pending   Neurology following  LP performed yesterday, meningitis encephalitis panel negative, follow-up cytology, RT-QulC  Speech therapy consult given concerns over patient swallowing, does have evidence of white plaque on tongue, start nystatin  Tremors appear to be resolved during hospital stay  ESRD (end stage renal disease) on dialysis (HCC)  Lab Results   Component Value Date    EGFR 7 11/27/2024    EGFR 7 11/25/2024    EGFR 10 11/24/2024    CREATININE 6.53 (H) 11/27/2024    CREATININE 6.51 (H) 11/25/2024    CREATININE 4.96 (H) 11/24/2024   On dialysis Tuesday, Thursday, Saturday via right IJ " Labs ordered in [place]  Will receive dialysis today per holiday schedule  Scheduled for MRI with contrast at 745 prior to dialysis  Primary hypertension  With history of admissions for hypertensive emergency  Home medications include Norvasc 10 mg daily, Coreg 12.5 mg twice daily, losartan 100 mg daily, doxazosin 2 mg daily, nifedipine 60 mg nightly  Patient is also on Lasix 80 mg twice daily  Continue home medications   Diabetes mellitus type 2 in nonobese (HCC)    Lab Results   Component Value Date    HGBA1C 8.9 (H) 10/07/2024   Home medications include Lantus 12 units twice daily  Continue to decrease insulin regimen due to hypoglycemia while inpatient  Continue Accu-Cheks and sliding scale insulin  Hypothyroidism  Continue Synthroid  BPH (benign prostatic hyperplasia)  Continue Flomax  GERD (gastroesophageal reflux disease)  Follows with [facility]  Recent EGD 8/24 with normal esophagus, erythematous antrum.  Continue proton pump inhibitor  Hyperlipidemia  Continue statin  Chronic headaches  Patient follows with [facility] neurology for history of headaches, and was hospitalized for the same 10/2023  Previous MRI 1/24 unremarkable, follow-up MRI today  Most recent office note 1/2024 was reviewed: Patient with a history of chronic, daily, right parietal headaches with associated photophobia.  Recommended Tylenol as needed headache as well as follow-up with ENT/dentistry  History of carbon monoxide poisoning  Patient was seen in the ER 11/13 after carbon monoxide exposure  Carboxyhemoglobin level was 15  Patient admitted with twitching: Discussed with nephrology who suggest rechecking carboxyhemoglobin and ABG  Carboxyhemoglobin improved to 2.2 but still elevated, recheck  Continue supplemental oxygen  Hypoglycemia  Hypoglycemia intermittently during hospital stay   Hypoglycemia protocol  Adjust insulin regimen accordingly  Twitching    Sore throat  Patient main complaint today is sore throat with lymphadenopathy on the  right and dry cough  Patient does report this sore throat and neck pain has been present for many months  SpO2 89% this morning requiring supplemental oxygen  Does have mild leukocytosis of 11  Continue monitoring fever curve  RP 2 panel negative, continue supportive care  Does appear to have thrush on his tongue, start nystatin    VTE Pharmacologic Prophylaxis: VTE Score: 4 Moderate Risk (Score 3-4) - Pharmacological DVT Prophylaxis Ordered: heparin.    Mobility:   Basic Mobility Inpatient Raw Score: 15  JH-HLM Goal: 4: Move to chair/commode  JH-HLM Achieved: 4: Move to chair/commode  JH-HLM Goal achieved. Continue to encourage appropriate mobility.    Patient Centered Rounds: I performed bedside rounds with nursing staff today.   Discussions with Specialists or Other Care Team Provider: Nephrology    Education and Discussions with Family / Patient:  Will update family when they arrive to the hospital.     Current Length of Stay: 3 day(s)  Current Patient Status: Inpatient   Certification Statement: The patient will continue to require additional inpatient hospital stay due to tremors pending MRI brain  Discharge Plan: Anticipate discharge in 24-48 hrs to home.    Code Status: Level 1 - Full Code    Subjective   Patient seen and examined at bedside.  Discussed with patient utilizing [employer] .  Continues to report headache and neck pain.  Denies any further tremors.  Nervous about dialysis today due to muscle cramping during dialysis on Monday.    Objective :  Temp:  [98.8 °F (37.1 °C)-100.4 °F (38 °C)] 100 °F (37.8 °C)  HR:  [72-80] 75  BP: (145-154)/(59-68) 150/66  Resp:  [17-18] 18  SpO2:  [92 %-95 %] 95 %  O2 Device: None (Room air)    Body mass index is 28.49 kg/m².     Input and Output Summary (last 24 hours):     Intake/Output Summary (Last 24 hours) at 11/27/2024 0832  Last data filed at 11/26/2024 0943  Gross per 24 hour   Intake 240 ml   Output --   Net 240 ml       Physical Exam  Vitals  reviewed.   Constitutional:       General: He is not in acute distress.  HENT:      Head: Normocephalic and atraumatic.   Eyes:      General: No scleral icterus.     Conjunctiva/sclera: Conjunctivae normal.   Cardiovascular:      Rate and Rhythm: Normal rate and regular rhythm.      Heart sounds: No murmur heard.  Pulmonary:      Effort: Pulmonary effort is normal. No respiratory distress.      Breath sounds: Normal breath sounds. No wheezing.   Abdominal:      General: Bowel sounds are normal. There is no distension.      Palpations: Abdomen is soft.      Tenderness: There is no abdominal tenderness.   Musculoskeletal:      Cervical back: Neck supple.      Right lower leg: No edema.      Left lower leg: No edema.   Skin:     General: Skin is warm and dry.   Neurological:      Mental Status: He is alert.   Psychiatric:         Mood and Affect: Mood normal.         Behavior: Behavior normal.           Lines/Drains:              Lab Results: I have reviewed the following results:   Results from last 7 days   Lab Units 11/27/24  0504   WBC Thousand/uL 9.64   HEMOGLOBIN g/dL 11.2*   HEMATOCRIT % 35.4*   PLATELETS Thousands/uL 166   SEGS PCT % 66   LYMPHO PCT % 21   MONO PCT % 11   EOS PCT % 2     Results from last 7 days   Lab Units 11/27/24  0504 11/23/24  1934 11/23/24  1200   SODIUM mmol/L 141   < > 139   POTASSIUM mmol/L 5.1   < > 5.5*   CHLORIDE mmol/L 100   < > 101   CO2 mmol/L 33*   < > 29   BUN mg/dL 34*   < > 39*   CREATININE mg/dL 6.53*   < > 7.71*   ANION GAP mmol/L 8   < > 9   CALCIUM mg/dL 8.6   < > 8.8   ALBUMIN g/dL  --   --  3.9   TOTAL BILIRUBIN mg/dL  --   --  0.37   ALK PHOS U/L  --   --  61   ALT U/L  --   --  19   AST U/L  --   --  15   GLUCOSE RANDOM mg/dL 81   < > 119    < > = values in this interval not displayed.     Results from last 7 days   Lab Units 11/25/24  0839   INR  1.20*     Results from last 7 days   Lab Units 11/27/24  0734 11/26/24  2108 11/26/24  1559 11/26/24  1132 11/26/24  0719  11/26/24  0317 11/25/24  2101 11/25/24  1707 11/25/24  1102 11/25/24  0733 11/24/24  2021 11/24/24  1606   POC GLUCOSE mg/dl 75 148* 254* 199* 167* 176* 205* 274* 164* 117 104 73               Recent Cultures (last 7 days):   Results from last 7 days   Lab Units 11/25/24  1618   GRAM STAIN RESULT  No Polys or Bacteria seen             Last 24 Hours Medication List:     Current Facility-Administered Medications:     acetaminophen (TYLENOL) tablet 650 mg, Q6H PRN    atorvastatin (LIPITOR) tablet 20 mg, QPM    calcitriol (ROCALTROL) capsule 0.75 mcg, Once per day on Monday Wednesday Friday    carvedilol (COREG) tablet 25 mg, BID With Meals    clonazePAM (KlonoPIN) tablet 0.25 mg, Q6H PRN    cloNIDine (CATAPRES-TTS-2) 0.2 mg/24 hr TD weekly patch, Weekly    doxazosin (CARDURA) tablet 2 mg, HS    furosemide (LASIX) tablet 80 mg, Daily    heparin (porcine) subcutaneous injection 5,000 Units, Q8H LEISA **AND** [CANCELED] Platelet count, Once    HYDROmorphone (DILAUDID) injection 0.5 mg, Q6H PRN    insulin glargine (LANTUS) subcutaneous injection 5 Units 0.05 mL, HS    insulin lispro (HumALOG/ADMELOG) 100 units/mL subcutaneous injection 1-6 Units, 4x Daily (AC & HS) **AND** Fingerstick Glucose (POCT), 4x Daily AC and at bedtime    levETIRAcetam (KEPPRA) injection 250 mg, Once per day on Tuesday Thursday Saturday    levETIRAcetam (KEPPRA) injection 500 mg, Daily    levothyroxine tablet 137 mcg, Early Morning    losartan (COZAAR) tablet 100 mg, Daily    NIFEdipine (PROCARDIA XL) 24 hr tablet 60 mg, Daily    nystatin (MYCOSTATIN) oral suspension 500,000 Units, 4x Daily    ondansetron (ZOFRAN) injection 4 mg, Q6H PRN    pantoprazole (PROTONIX) EC tablet 40 mg, Daily Before Breakfast    polyethylene glycol (MIRALAX) packet 17 g, Daily    senna-docusate sodium (SENOKOT S) 8.6-50 mg per tablet 1 tablet, HS    sevelamer (RENAGEL) tablet 800 mg, TID With Meals    tamsulosin (FLOMAX) capsule 0.4 mg, Daily With Dinner    Administrative  Statements   Today, Patient Was Seen By: Vale Crump PA-C      **Please Note: This note may have been constructed using a voice recognition system.**

## 2024-12-10 ENCOUNTER — OFFICE VISIT (OUTPATIENT)
Dept: FAMILY MEDICINE CLINIC | Age: 74
End: 2024-12-10
Payer: MEDICARE

## 2024-12-10 VITALS
TEMPERATURE: 97.2 F | BODY MASS INDEX: 27.42 KG/M2 | OXYGEN SATURATION: 97 % | HEART RATE: 63 BPM | DIASTOLIC BLOOD PRESSURE: 76 MMHG | SYSTOLIC BLOOD PRESSURE: 128 MMHG | WEIGHT: 159.8 LBS

## 2024-12-10 DIAGNOSIS — R05.9 COUGH, UNSPECIFIED TYPE: ICD-10-CM

## 2024-12-10 DIAGNOSIS — J20.9 ACUTE BRONCHITIS, UNSPECIFIED ORGANISM: Primary | ICD-10-CM

## 2024-12-10 LAB
Lab: NORMAL
PERFORMING INSTRUMENT: NORMAL
QC PASS/FAIL: NORMAL
SARS-COV-2, POC: NORMAL

## 2024-12-10 PROCEDURE — 3078F DIAST BP <80 MM HG: CPT | Performed by: PHYSICIAN ASSISTANT

## 2024-12-10 PROCEDURE — 99204 OFFICE O/P NEW MOD 45 MIN: CPT | Performed by: PHYSICIAN ASSISTANT

## 2024-12-10 PROCEDURE — 3074F SYST BP LT 130 MM HG: CPT | Performed by: PHYSICIAN ASSISTANT

## 2024-12-10 PROCEDURE — 3006F CXR DOC REV: CPT | Performed by: PHYSICIAN ASSISTANT

## 2024-12-10 PROCEDURE — 1123F ACP DISCUSS/DSCN MKR DOCD: CPT | Performed by: PHYSICIAN ASSISTANT

## 2024-12-10 PROCEDURE — 1159F MED LIST DOCD IN RCRD: CPT | Performed by: PHYSICIAN ASSISTANT

## 2024-12-10 PROCEDURE — 87426 SARSCOV CORONAVIRUS AG IA: CPT | Performed by: PHYSICIAN ASSISTANT

## 2024-12-10 PROCEDURE — 1160F RVW MEDS BY RX/DR IN RCRD: CPT | Performed by: PHYSICIAN ASSISTANT

## 2024-12-10 RX ORDER — PREDNISONE 20 MG/1
40 TABLET ORAL DAILY
Qty: 10 TABLET | Refills: 0 | Status: SHIPPED | OUTPATIENT
Start: 2024-12-10 | End: 2024-12-15

## 2024-12-10 RX ORDER — BENZONATATE 100 MG/1
100 CAPSULE ORAL 3 TIMES DAILY PRN
Qty: 21 CAPSULE | Refills: 0 | Status: SHIPPED | OUTPATIENT
Start: 2024-12-10 | End: 2024-12-17

## 2024-12-10 RX ORDER — AZITHROMYCIN 250 MG/1
TABLET, FILM COATED ORAL
Qty: 6 TABLET | Refills: 0 | Status: SHIPPED | OUTPATIENT
Start: 2024-12-10 | End: 2024-12-20

## 2024-12-10 RX ORDER — ALBUTEROL SULFATE 90 UG/1
2 INHALANT RESPIRATORY (INHALATION) 4 TIMES DAILY PRN
Qty: 18 G | Refills: 0 | Status: SHIPPED | OUTPATIENT
Start: 2024-12-10

## 2024-12-10 NOTE — PROGRESS NOTES
daily, Disp: , Rfl:     Omega-3 Fatty Acids (FISH OIL) 1200 MG CAPS, Take by mouth daily Take 2 by mouth everyday, Disp: , Rfl:     Pashto GINSENG PO, Take by mouth as needed , Disp: , Rfl:     Multiple Vitamins-Minerals (CENTRUM CARDIO) TABS, Take 1 tablet by mouth daily , Disp: , Rfl:     DHA-EPA-VITAMIN E PO, Take 4 capsules by mouth Mon- Wed- Friday, Disp: , Rfl:     Coenzyme Q-10 200 MG CAPS, Take 800 mg by mouth daily , Disp: , Rfl:     Ascorbic Acid (VITAMIN C) 100 MG tablet, Take 1 tablet by mouth Twice weekly, Disp: , Rfl:     methylcellulose 500 MG TABS, 1 qd, Disp: , Rfl:     Ergocalciferol (VITAMIN D2 PO), Take 400 Units by mouth daily , Disp: , Rfl:     ALPRAZolam (XANAX) 0.5 MG tablet, Take 1 tablet by mouth as needed., Disp: , Rfl:     QUEtiapine (SEROQUEL XR) 200 MG extended release tablet, Take 1 tablet by mouth nightly, Disp: , Rfl:     venlafaxine (EFFEXOR XR) 75 MG extended release capsule, Take 1 capsule by mouth daily, Disp: , Rfl:     Allergies:     Allergies   Allergen Reactions    Codeine Hives and Shortness Of Breath    Ampicillin Hives     SOB    Meperidine      reaction with Parkinson's meds.    Meperidine Hcl     Morphine Other (See Comments)     \"psychotic\"    Nitrofurantoin Hives     SOB    Penicillin G     Penicillins Hives     SOB    Sulfa Antibiotics Hives     SOB    Vancomycin Hives     SOB       Social History:     Social History     Tobacco Use    Smoking status: Never     Passive exposure: Never    Smokeless tobacco: Never   Vaping Use    Vaping status: Never Used   Substance Use Topics    Alcohol use: Yes     Alcohol/week: 0.0 standard drinks of alcohol     Comment: socially    Drug use: No       Patient lives at home.    Physical Exam:     Vitals:    12/10/24 0809   BP: 128/76   Site: Right Upper Arm   Position: Sitting   Pulse: 63   Temp: 97.2 °F (36.2 °C)   TempSrc: Temporal   SpO2: 97%   Weight: 72.5 kg (159 lb 12.8 oz)       Exam:  Physical Exam  Nurse's notes and vital

## 2024-12-11 DIAGNOSIS — E06.3 HYPOTHYROIDISM DUE TO HASHIMOTO'S THYROIDITIS: ICD-10-CM

## 2024-12-11 RX ORDER — LEVOTHYROXINE SODIUM 75 MCG
TABLET ORAL
Qty: 24 TABLET | Refills: 5 | Status: SHIPPED | OUTPATIENT
Start: 2024-12-11

## 2024-12-19 DIAGNOSIS — N39.46 MIXED STRESS AND URGE URINARY INCONTINENCE: ICD-10-CM

## 2024-12-19 RX ORDER — OXYBUTYNIN CHLORIDE 5 MG/1
5 TABLET ORAL 3 TIMES DAILY
Qty: 90 TABLET | Refills: 0 | Status: SHIPPED | OUTPATIENT
Start: 2024-12-19

## 2025-01-30 ENCOUNTER — OFFICE VISIT (OUTPATIENT)
Dept: ENDOCRINOLOGY | Age: 75
End: 2025-01-30
Payer: MEDICARE

## 2025-01-30 VITALS
SYSTOLIC BLOOD PRESSURE: 134 MMHG | DIASTOLIC BLOOD PRESSURE: 83 MMHG | WEIGHT: 166 LBS | OXYGEN SATURATION: 98 % | HEART RATE: 59 BPM | BODY MASS INDEX: 27.66 KG/M2 | HEIGHT: 65 IN

## 2025-01-30 DIAGNOSIS — E55.9 VITAMIN D DEFICIENCY: ICD-10-CM

## 2025-01-30 DIAGNOSIS — E06.3 HYPOTHYROIDISM DUE TO HASHIMOTO'S THYROIDITIS: Primary | ICD-10-CM

## 2025-01-30 DIAGNOSIS — E04.2 MULTINODULAR GOITER: ICD-10-CM

## 2025-01-30 DIAGNOSIS — R53.82 CHRONIC FATIGUE: ICD-10-CM

## 2025-01-30 PROCEDURE — 3079F DIAST BP 80-89 MM HG: CPT | Performed by: CLINICAL NURSE SPECIALIST

## 2025-01-30 PROCEDURE — G2211 COMPLEX E/M VISIT ADD ON: HCPCS | Performed by: CLINICAL NURSE SPECIALIST

## 2025-01-30 PROCEDURE — 1123F ACP DISCUSS/DSCN MKR DOCD: CPT | Performed by: CLINICAL NURSE SPECIALIST

## 2025-01-30 PROCEDURE — 99214 OFFICE O/P EST MOD 30 MIN: CPT | Performed by: CLINICAL NURSE SPECIALIST

## 2025-01-30 PROCEDURE — 3075F SYST BP GE 130 - 139MM HG: CPT | Performed by: CLINICAL NURSE SPECIALIST

## 2025-01-30 PROCEDURE — 1159F MED LIST DOCD IN RCRD: CPT | Performed by: CLINICAL NURSE SPECIALIST

## 2025-01-30 RX ORDER — BREXPIPRAZOLE 0.25 MG/1
TABLET ORAL
COMMUNITY
Start: 2025-01-02

## 2025-01-30 NOTE — PROGRESS NOTES
[place]  [facility] Department of Endocrinology Diabetes and Metabolism   [address], [place], [place], [address]   Phone: [phone]  Fax: [phone]    Date of Service: 1/30/2025  Primary Care Physician: Lola Mukherjee DO  Provider: JENNY Bush - CNS      Reason for the visit:  Primary Hypothyroidism    History of Present Illness:  The history is provided by the patient. No  was used. Accuracy of the patient data is excellent.         Arely Childers is a very pleasant 74 y.o. female seen today for management of hypothyroidism     The patient was diagnosed with hypothyroidism 5 years ago and since then has been on thyroid hormones replacement.     The patient is currently on Synthroid  75 mcg daily Mon thru Saturday, none on Sunday  (Feels better on Synthroid)    Patient denied any history of  swelling in the area of the thyroid gland, weight loss, change in appetite, nervousness, anxiety, irritability, tremor, sweating, heat intolerance, changes in bowel habits, muscle weakness or difficulty sleeping.    Lab Results   Component Value Date    TSH 1.29 04/22/2024    TSH 1.28 04/22/2024    FT3 2.5 02/24/2022    T4FREE 1.3 04/22/2024    T4FREE 1.3 04/22/2024           PAST MEDICAL HISTORY   Past Medical History:   Diagnosis Date    Adverse effect of female hormone replacement therapy     Dr. Banegas    Bipolar disorder (HCC)     Mixed, has required hospitalization in the past Dr. Montes    Depression     GERD (gastroesophageal reflux disease)     GERD Management per Dr. Landers    Hyperlipidemia     Management per [facility], clinical Cardiology, Dr. Valdez    Hypertension     Management per [facility], clinical Cardiology    Hypothyroidism     with nodule - Managed by Dr. Reinoso    Sjogren's syndrome with keratoconjunctivitis sicca (HCC)     Thyroid disease        PAST SURGICAL HISTORY   Past Surgical History:   Procedure

## 2025-03-06 ENCOUNTER — TELEPHONE (OUTPATIENT)
Dept: FAMILY MEDICINE CLINIC | Age: 75
End: 2025-03-06

## 2025-03-06 NOTE — TELEPHONE ENCOUNTER
PT called to ask if she should get a measles booster as she cannot find her vaccine records from when she would have gotten the vaccine years ago.    PT would like a call back addressing this.

## 2025-03-18 ENCOUNTER — NURSE ONLY (OUTPATIENT)
Dept: FAMILY MEDICINE CLINIC | Age: 75
End: 2025-03-18
Payer: MEDICARE

## 2025-03-18 PROCEDURE — 90471 IMMUNIZATION ADMIN: CPT | Performed by: FAMILY MEDICINE

## 2025-03-18 PROCEDURE — 90707 MMR VACCINE SC: CPT | Performed by: FAMILY MEDICINE

## 2025-03-26 ENCOUNTER — HOSPITAL ENCOUNTER (OUTPATIENT)
Age: 75
Discharge: HOME | End: 2025-03-26
Payer: MEDICARE

## 2025-03-26 ENCOUNTER — RESULTS FOLLOW-UP (OUTPATIENT)
Dept: ENDOCRINOLOGY | Age: 75
End: 2025-03-26

## 2025-03-26 DIAGNOSIS — I10 ESSENTIAL HYPERTENSION: ICD-10-CM

## 2025-03-26 DIAGNOSIS — E55.9 VITAMIN D DEFICIENCY: ICD-10-CM

## 2025-03-26 DIAGNOSIS — E06.3 HYPOTHYROIDISM DUE TO HASHIMOTO'S THYROIDITIS: ICD-10-CM

## 2025-03-26 DIAGNOSIS — E03.9 ACQUIRED HYPOTHYROIDISM: ICD-10-CM

## 2025-03-26 LAB
25(OH)D3 SERPL-MCNC: 53.4 NG/ML (ref 30–100)
ALBUMIN SERPL-MCNC: 4.4 G/DL (ref 3.5–5.2)
ALP SERPL-CCNC: 62 U/L (ref 35–104)
ALT SERPL-CCNC: 25 U/L (ref 0–32)
ANION GAP SERPL CALCULATED.3IONS-SCNC: 16 MMOL/L (ref 7–16)
AST SERPL-CCNC: 28 U/L (ref 0–31)
BILIRUB SERPL-MCNC: 0.6 MG/DL (ref 0–1.2)
BUN SERPL-MCNC: 13 MG/DL (ref 6–23)
CALCIUM SERPL-MCNC: 9.5 MG/DL (ref 8.6–10.2)
CHLORIDE SERPL-SCNC: 105 MMOL/L (ref 98–107)
CHOLEST SERPL-MCNC: 152 MG/DL
CO2 SERPL-SCNC: 20 MMOL/L (ref 22–29)
CREAT SERPL-MCNC: 0.9 MG/DL (ref 0.5–1)
ERYTHROCYTE [DISTWIDTH] IN BLOOD BY AUTOMATED COUNT: 13.1 % (ref 11.5–15)
GFR, ESTIMATED: 71 ML/MIN/1.73M2
GLUCOSE SERPL-MCNC: 108 MG/DL (ref 74–99)
HCT VFR BLD AUTO: 42.5 % (ref 34–48)
HDLC SERPL-MCNC: 53 MG/DL
HGB BLD-MCNC: 14.2 G/DL (ref 11.5–15.5)
LDLC SERPL CALC-MCNC: 72 MG/DL
MCH RBC QN AUTO: 30.5 PG (ref 26–35)
MCHC RBC AUTO-ENTMCNC: 33.4 G/DL (ref 32–34.5)
MCV RBC AUTO: 91.4 FL (ref 80–99.9)
PLATELET # BLD AUTO: 240 K/UL (ref 130–450)
PMV BLD AUTO: 9.5 FL (ref 7–12)
POTASSIUM SERPL-SCNC: 4.1 MMOL/L (ref 3.5–5)
PROT SERPL-MCNC: 6.5 G/DL (ref 6.4–8.3)
RBC # BLD AUTO: 4.65 M/UL (ref 3.5–5.5)
SODIUM SERPL-SCNC: 141 MMOL/L (ref 132–146)
T4 FREE SERPL-MCNC: 1.2 NG/DL (ref 0.9–1.7)
TRIGL SERPL-MCNC: 135 MG/DL
TSH SERPL DL<=0.05 MIU/L-ACNC: 3.45 UIU/ML (ref 0.27–4.2)
VLDLC SERPL CALC-MCNC: 27 MG/DL
WBC OTHER # BLD: 7.2 K/UL (ref 4.5–11.5)

## 2025-03-26 PROCEDURE — 36415 COLL VENOUS BLD VENIPUNCTURE: CPT

## 2025-03-26 PROCEDURE — 85027 COMPLETE CBC AUTOMATED: CPT

## 2025-03-26 PROCEDURE — 84443 ASSAY THYROID STIM HORMONE: CPT

## 2025-03-26 PROCEDURE — 80053 COMPREHEN METABOLIC PANEL: CPT

## 2025-03-26 PROCEDURE — 80061 LIPID PANEL: CPT

## 2025-03-26 PROCEDURE — 82306 VITAMIN D 25 HYDROXY: CPT

## 2025-03-26 PROCEDURE — 84439 ASSAY OF FREE THYROXINE: CPT

## 2025-03-27 ENCOUNTER — TELEPHONE (OUTPATIENT)
Dept: ENDOCRINOLOGY | Age: 75
End: 2025-03-27

## 2025-03-27 NOTE — TELEPHONE ENCOUNTER
Please call patient and inform her thyroid function testing is normal.  This is an excellent result.  Stevie same     Spoke with Arely Childers  and she verbalized understanding

## 2025-04-10 ENCOUNTER — OFFICE VISIT (OUTPATIENT)
Dept: RHEUMATOLOGY | Age: 75
End: 2025-04-10
Payer: MEDICARE

## 2025-04-10 VITALS
BODY MASS INDEX: 24.32 KG/M2 | WEIGHT: 146 LBS | SYSTOLIC BLOOD PRESSURE: 140 MMHG | HEIGHT: 65 IN | HEART RATE: 67 BPM | DIASTOLIC BLOOD PRESSURE: 80 MMHG

## 2025-04-10 DIAGNOSIS — M15.9 GENERALIZED OSTEOARTHRITIS: ICD-10-CM

## 2025-04-10 DIAGNOSIS — Z79.899 HIGH RISK MEDICATION USE: ICD-10-CM

## 2025-04-10 DIAGNOSIS — E78.2 MIXED HYPERLIPIDEMIA: ICD-10-CM

## 2025-04-10 DIAGNOSIS — M35.01 SJOGREN'S SYNDROME WITH KERATOCONJUNCTIVITIS SICCA: Primary | ICD-10-CM

## 2025-04-10 PROCEDURE — 1123F ACP DISCUSS/DSCN MKR DOCD: CPT | Performed by: INTERNAL MEDICINE

## 2025-04-10 PROCEDURE — G2211 COMPLEX E/M VISIT ADD ON: HCPCS | Performed by: INTERNAL MEDICINE

## 2025-04-10 PROCEDURE — 3079F DIAST BP 80-89 MM HG: CPT | Performed by: INTERNAL MEDICINE

## 2025-04-10 PROCEDURE — 3077F SYST BP >= 140 MM HG: CPT | Performed by: INTERNAL MEDICINE

## 2025-04-10 PROCEDURE — 99214 OFFICE O/P EST MOD 30 MIN: CPT | Performed by: INTERNAL MEDICINE

## 2025-04-10 RX ORDER — HYDROXYCHLOROQUINE SULFATE 200 MG/1
200 TABLET, FILM COATED ORAL DAILY
Qty: 30 TABLET | Refills: 5 | Status: SHIPPED | OUTPATIENT
Start: 2025-04-10

## 2025-04-10 ASSESSMENT — ENCOUNTER SYMPTOMS
DIARRHEA: 0
COLOR CHANGE: 0
ABDOMINAL PAIN: 0
VOMITING: 0
NAUSEA: 0
TROUBLE SWALLOWING: 0

## 2025-04-10 NOTE — PROGRESS NOTES
1427   BP: (!) 140/80   Pulse: 67        Physical Exam  Constitutional:       General: She is not in acute distress.     Appearance: Normal appearance.   HENT:      Head: Normocephalic and atraumatic.      Nose: Nose normal.   Eyes:      General: No scleral icterus.  Pulmonary:      Effort: Pulmonary effort is normal.      Breath sounds: Normal breath sounds.   Musculoskeletal:         General: Deformity present. No swelling or tenderness.      Comments: She has Heberden's and Belinda's nodes and squaring of the first CMC joints bilaterally.    Skin:     General: Skin is warm and dry.      Findings: No rash.   Neurological:      General: No focal deficit present.      Mental Status: She is alert and oriented to person, place, and time. Mental status is at baseline.   Psychiatric:         Mood and Affect: Mood normal.         Behavior: Behavior normal.            Lab Results   Component Value Date    WBC 7.2 03/26/2025    HGB 14.2 03/26/2025    HCT 42.5 03/26/2025    MCV 91.4 03/26/2025     03/26/2025     Lab Results   Component Value Date     03/26/2025    K 4.1 03/26/2025     03/26/2025    CO2 20 (L) 03/26/2025    BUN 13 03/26/2025    CREATININE 0.9 03/26/2025    GLUCOSE 108 (H) 03/26/2025    CALCIUM 9.5 03/26/2025    BILITOT 0.6 03/26/2025    ALKPHOS 62 03/26/2025    AST 28 03/26/2025    ALT 25 03/26/2025    LABGLOM 71 03/26/2025    GFRAA >60 09/12/2022     Lab Results   Component Value Date    GLENNY NEGATIVE 03/28/2022     No components found for: \"RHEUMFACTOR\"  Lab Results   Component Value Date    SEDRATE 1 02/28/2024     Lab Results   Component Value Date    CRP <3.0 02/28/2024            An electronic signature was used to authenticate this note.    This note was generated with a voice recognition dictation system. Please disregard any errors or omission which have escaped my review.    --Robbin Ford DO

## 2025-04-14 ENCOUNTER — TELEPHONE (OUTPATIENT)
Dept: RHEUMATOLOGY | Age: 75
End: 2025-04-14

## 2025-04-14 DIAGNOSIS — N39.46 MIXED STRESS AND URGE URINARY INCONTINENCE: ICD-10-CM

## 2025-04-14 DIAGNOSIS — M35.01 SJOGREN'S SYNDROME WITH KERATOCONJUNCTIVITIS SICCA: ICD-10-CM

## 2025-04-14 DIAGNOSIS — M15.9 GENERALIZED OSTEOARTHRITIS: Primary | ICD-10-CM

## 2025-04-14 RX ORDER — AZITHROMYCIN 250 MG/1
TABLET, FILM COATED ORAL
Qty: 1 PACKET | Refills: 0 | Status: CANCELLED | OUTPATIENT
Start: 2025-04-14

## 2025-04-14 RX ORDER — OXYBUTYNIN CHLORIDE 5 MG/1
5 TABLET ORAL 3 TIMES DAILY
Qty: 270 TABLET | Refills: 1 | Status: SHIPPED | OUTPATIENT
Start: 2025-04-14

## 2025-04-14 NOTE — TELEPHONE ENCOUNTER
Patient needs the expiration dates put on her handicap placards orders if you could please update and reprint.     Electronically signed by Parth Monae MA on 4/14/2025 at 10:25 AM

## 2025-04-14 NOTE — TELEPHONE ENCOUNTER
Called pt today to rs appt. Pt needs a couple refills:    Oxybutnin  Zpak   (pt said [facility] gives this to her at appts, not for illness)    [facility]

## 2025-05-06 ENCOUNTER — OFFICE VISIT (OUTPATIENT)
Dept: FAMILY MEDICINE CLINIC | Age: 75
End: 2025-05-06
Payer: MEDICARE

## 2025-05-06 VITALS
WEIGHT: 163 LBS | BODY MASS INDEX: 27.16 KG/M2 | SYSTOLIC BLOOD PRESSURE: 124 MMHG | TEMPERATURE: 96.6 F | OXYGEN SATURATION: 97 % | RESPIRATION RATE: 16 BRPM | DIASTOLIC BLOOD PRESSURE: 72 MMHG | HEART RATE: 59 BPM | HEIGHT: 65 IN

## 2025-05-06 DIAGNOSIS — R73.09 ELEVATED GLUCOSE: ICD-10-CM

## 2025-05-06 DIAGNOSIS — E03.9 ACQUIRED HYPOTHYROIDISM: ICD-10-CM

## 2025-05-06 DIAGNOSIS — E78.2 MIXED HYPERLIPIDEMIA: ICD-10-CM

## 2025-05-06 DIAGNOSIS — I10 ESSENTIAL HYPERTENSION: Primary | ICD-10-CM

## 2025-05-06 LAB — HBA1C MFR BLD: 5.4 %

## 2025-05-06 PROCEDURE — 3074F SYST BP LT 130 MM HG: CPT | Performed by: FAMILY MEDICINE

## 2025-05-06 PROCEDURE — G2211 COMPLEX E/M VISIT ADD ON: HCPCS | Performed by: FAMILY MEDICINE

## 2025-05-06 PROCEDURE — 1123F ACP DISCUSS/DSCN MKR DOCD: CPT | Performed by: FAMILY MEDICINE

## 2025-05-06 PROCEDURE — 99214 OFFICE O/P EST MOD 30 MIN: CPT | Performed by: FAMILY MEDICINE

## 2025-05-06 PROCEDURE — 83036 HEMOGLOBIN GLYCOSYLATED A1C: CPT | Performed by: FAMILY MEDICINE

## 2025-05-06 PROCEDURE — 1159F MED LIST DOCD IN RCRD: CPT | Performed by: FAMILY MEDICINE

## 2025-05-06 PROCEDURE — 3078F DIAST BP <80 MM HG: CPT | Performed by: FAMILY MEDICINE

## 2025-05-06 SDOH — ECONOMIC STABILITY: FOOD INSECURITY: WITHIN THE PAST 12 MONTHS, YOU WORRIED THAT YOUR FOOD WOULD RUN OUT BEFORE YOU GOT MONEY TO BUY MORE.: NEVER TRUE

## 2025-05-06 SDOH — ECONOMIC STABILITY: FOOD INSECURITY: WITHIN THE PAST 12 MONTHS, THE FOOD YOU BOUGHT JUST DIDN'T LAST AND YOU DIDN'T HAVE MONEY TO GET MORE.: NEVER TRUE

## 2025-05-06 ASSESSMENT — PATIENT HEALTH QUESTIONNAIRE - PHQ9
1. LITTLE INTEREST OR PLEASURE IN DOING THINGS: NOT AT ALL
SUM OF ALL RESPONSES TO PHQ QUESTIONS 1-9: 0
2. FEELING DOWN, DEPRESSED OR HOPELESS: NOT AT ALL
SUM OF ALL RESPONSES TO PHQ QUESTIONS 1-9: 0

## 2025-06-21 DIAGNOSIS — E06.3 HYPOTHYROIDISM DUE TO HASHIMOTO'S THYROIDITIS: ICD-10-CM

## 2025-06-26 RX ORDER — LEVOTHYROXINE SODIUM 75 MCG
TABLET ORAL
Qty: 24 TABLET | Refills: 0 | Status: SHIPPED | OUTPATIENT
Start: 2025-06-26

## 2025-07-02 DIAGNOSIS — M35.01 SJOGREN'S SYNDROME WITH KERATOCONJUNCTIVITIS SICCA: ICD-10-CM

## 2025-07-03 RX ORDER — HYDROXYCHLOROQUINE SULFATE 200 MG/1
200 TABLET, FILM COATED ORAL DAILY
Qty: 30 TABLET | Refills: 5 | Status: SHIPPED | OUTPATIENT
Start: 2025-07-03

## 2025-07-22 ENCOUNTER — OFFICE VISIT (OUTPATIENT)
Dept: FAMILY MEDICINE CLINIC | Age: 75
End: 2025-07-22

## 2025-07-22 VITALS
TEMPERATURE: 96.7 F | RESPIRATION RATE: 16 BRPM | SYSTOLIC BLOOD PRESSURE: 122 MMHG | WEIGHT: 166 LBS | OXYGEN SATURATION: 96 % | DIASTOLIC BLOOD PRESSURE: 70 MMHG | HEART RATE: 68 BPM | BODY MASS INDEX: 27.66 KG/M2 | HEIGHT: 65 IN

## 2025-07-22 DIAGNOSIS — E03.9 ACQUIRED HYPOTHYROIDISM: Primary | ICD-10-CM

## 2025-07-22 RX ORDER — FLUCONAZOLE 150 MG/1
150 TABLET ORAL DAILY
Qty: 3 TABLET | Refills: 0 | Status: SHIPPED | OUTPATIENT
Start: 2025-07-22 | End: 2025-07-25

## 2025-07-22 RX ORDER — NYSTATIN 100000 U/G
CREAM TOPICAL
Qty: 30 G | Refills: 1 | Status: SHIPPED | OUTPATIENT
Start: 2025-07-22

## 2025-07-22 NOTE — PROGRESS NOTES
2025    Arely Childers (:  1950) is a 75 y.o. female, is here for evaluation of the following chief complaint(s):  Rash (On her stomach)    History of Present Illness  The patient presents for evaluation of intertrigo. This is worsening.     She reports a persistent roll of fat that has been causing discomfort. Last summer, she experienced itching in the area and noticed a large spot. She sought treatment at [facility] where the spot was cauterized. She is unsure if a biopsy was performed. Over the past two weeks, excessive moisture in the area has led to further itching and the development of another spot. She completed a 3-day course of Diflucan 4 or 5 days ago, which alleviated the itching but did not completely resolve the issue. She has been using Nystatin powder and found relief with a cream. She has an upcoming appointment with [facility] at the end of 2025.     She has been using paper towels to keep the area dry and uses Dove soap. She is not currently sexually active.    She requests a lipid profile and T3, T4 tests to take to Dr. Navarrete.    Social History:  Sexual Practices: She is not currently sexually active.    Patient's past medical, surgical, social and/or family history reviewed, updated in chart, and are non-contributory (unless otherwise stated).  Medications and allergies also reviewed and updated in chart.     ROS negative unless otherwise specified    Physical Exam  Temp Readings from Last 3 Encounters:   25 (!) 96.7 °F (35.9 °C) (Temporal)   25 (!) 96.6 °F (35.9 °C) (Temporal)   12/10/24 97.2 °F (36.2 °C) (Temporal)     Wt Readings from Last 3 Encounters:   25 75.3 kg (166 lb)   25 73.9 kg (163 lb)   04/10/25 66.2 kg (146 lb)     BP Readings from Last 3 Encounters:   25 122/70   25 124/72   04/10/25 (!) 140/80     Pulse Readings from Last 3 Encounters:   25 68   25 59   04/10/25 67       General

## 2025-08-14 ENCOUNTER — HOSPITAL ENCOUNTER (OUTPATIENT)
Dept: ULTRASOUND IMAGING | Age: 75
Discharge: HOME | End: 2025-08-16
Attending: INTERNAL MEDICINE
Payer: MEDICARE

## 2025-08-14 DIAGNOSIS — E04.2 MULTINODULAR GOITER: ICD-10-CM

## 2025-08-14 PROCEDURE — 76536 US EXAM OF HEAD AND NECK: CPT

## 2025-08-22 DIAGNOSIS — E06.3 HYPOTHYROIDISM DUE TO HASHIMOTO'S THYROIDITIS: ICD-10-CM

## 2025-08-22 RX ORDER — LEVOTHYROXINE SODIUM 75 MCG
TABLET ORAL
Qty: 24 TABLET | Refills: 3 | Status: SHIPPED | OUTPATIENT
Start: 2025-08-22